# Patient Record
Sex: MALE | Race: WHITE
[De-identification: names, ages, dates, MRNs, and addresses within clinical notes are randomized per-mention and may not be internally consistent; named-entity substitution may affect disease eponyms.]

---

## 2017-11-02 ENCOUNTER — HOSPITAL ENCOUNTER (EMERGENCY)
Dept: HOSPITAL 84 - D.ER | Age: 57
Discharge: HOME | End: 2017-11-02
Payer: COMMERCIAL
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
## 2017-11-02 DIAGNOSIS — F17.200: ICD-10-CM
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
## 2017-11-02 DIAGNOSIS — M25.511: Primary | ICD-10-CM

## 2017-11-02 DIAGNOSIS — J44.9: ICD-10-CM
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
## 2017-11-02 DIAGNOSIS — S46.911A: ICD-10-CM

## 2017-11-12 ENCOUNTER — HOSPITAL ENCOUNTER (EMERGENCY)
Dept: HOSPITAL 75 - ER | Age: 57
Discharge: HOME | End: 2017-11-12
Payer: COMMERCIAL

## 2017-11-12 VITALS — BODY MASS INDEX: 32.9 KG/M2 | HEIGHT: 71 IN | WEIGHT: 235 LBS

## 2017-11-12 VITALS — DIASTOLIC BLOOD PRESSURE: 60 MMHG | SYSTOLIC BLOOD PRESSURE: 109 MMHG

## 2017-11-12 DIAGNOSIS — K21.9: ICD-10-CM

## 2017-11-12 DIAGNOSIS — Z82.49: ICD-10-CM

## 2017-11-12 DIAGNOSIS — Z87.19: ICD-10-CM

## 2017-11-12 DIAGNOSIS — M54.12: Primary | ICD-10-CM

## 2017-11-12 PROCEDURE — 99282 EMERGENCY DEPT VISIT SF MDM: CPT

## 2017-11-12 NOTE — ED UPPER EXTREMITY
General


Chief Complaint:  Upper Extremity


Stated Complaint:  R SHOULDER PAIN RADIATING INTO FINGERS


Nursing Triage Note:  


pt c/o right shoulder pain x 3 weeks.  pt was seen at hospital in [place], [place] last week et started on diclofenac et metaxalone and given a steroid 


injection.  denies improvement.  pain radiating to hand.


Nursing Sepsis Screen:  No Definite Risk


Source:  patient


Exam Limitations:  no limitations





History of Present Illness


Time seen by provider:  11:01


Initial Comments


To ER with right shoulder pain that radiates up into his neck and down into his 

fingers.  This is been present for one week without any known injury.  He works 

as a  and was seen at a hospital in [place] last week.  Given a 

prescription for metaxalone and diclofenac without improvement.  Does not have 

a local physician.


Onset:  last week


Severity:  moderate


Pain/Injury Location:  right shoulder, right arm, right hand


Method of Injury:  unknown


Modifying Factors:  Worse With Movement





Allergies and Home Medications


Allergies


Coded Allergies:  


     No Known Drug Allergies (Unverified , 9/21/16)





Home Medications


Diclofenac Sodium 75 Mg Tablet., (Reported)


Fluticasone/Vilanterol 1 Each Blst.w.dev, (Reported)


Metaxalone 800 Mg Tablet, (Reported)


Montelukast Sodium 10 Mg Tablet, (Reported)


Omeprazole Magnesium 20 Mg Tablet.dr, 20 MG PO DAILY, (Reported)


Pravastatin Sodium 20 Mg Tablet, 20 MG PO DAILY, #30


   Prescribed by: ROCAEL MOE on 9/22/16 1500





Constitutional:  see HPI


EENTM:  see HPI


Respiratory:  no symptoms reported


Cardiovascular:  no symptoms reported


Genitourinary:  no symptoms reported


Musculoskeletal:  no symptoms reported


Skin:  no symptoms reported





Past Medical-Social-Family Hx


Patient Social History


Type Used:  Cigarettes


Recent Foreign Travel:  No


Contact w/Someone Who Travel:  No


Recent Infectious Disease Expo:  No


Recent Hopitalizations:  No





Seasonal Allergies


Seasonal Allergies:  No





Surgeries


History of Surgeries:  No





Respiratory


History of Respiratory Disorde:  No


Currently Using CPAP:  No


Currently Using BIPAP:  No





Cardiovascular


History of Cardiac Disorders:  No





Neurological


History of Neurological Disord:  No





Reproductive System


Hx Reproductive Disorders:  No


Sexually Transmitted Disease:  No


HIV/AIDS:  No





Genitourinary


History of Genitourinary Disor:  No





Gastrointestinal


History of Gastrointestinal Di:  Yes


Gastrointestinal Disorders:  Abdominal Hernia, Gastroesophageal Reflux





Musculoskeletal


History of Musculoskeletal Dis:  No





Endocrine


History of Endocrine Disorders:  No





HEENT


History of HEENT Disorders:  No





Cancer


History of Cancer:  No


Did You Recieve Any Treatments:  No





Psychosocial


History of Psychiatric Problem:  No





Integumentary


History of Skin or Integumenta:  No





Blood Transfusions


History of Blood Disorders:  No


Adverse Reaction to a Blood Tr:  No





Family Medical History


Significant Family History:  CAD Over 55 Years Old


Family Medial History:  


Arthritis


  G8 BROTHER


Cardiovascular disease


  19 FATHER


Cataracts


  19 FATHER


Colon cancer


  19 FATHER


Deafness or hearing loss


  19 FATHER


Diabetes mellitus


  19 FATHER


Hypercholesterolemia


  19 FATHER


Hypertension


  19 FATHER


Myocardial infarction


  19 FATHER


  G8 BROTHER


Respiratory disorder


  G8 BROTHER





Physical Exam


Vital Signs





Vital Sign - Last 12Hours








 11/12/17





 09:22


 


Temp 98.0


 


Pulse 66


 


Resp 20


 


B/P (MAP) 109/60





Capillary Refill : Less Than 3 Seconds


General Appearance:  WD/WN, no apparent distress


HEENT:  PERRL/EOMI, normal ENT inspection


Neck:  non-tender, full range of motion


Cardiovascular:  regular rate, rhythm, no murmur


Respiratory:  normal breath sounds, no respiratory distress, no accessory 

muscle use


Gastrointestinal:  normal bowel sounds, non tender


Shoulder:  normal inspection, non-tender


Elbow/Forearm:  normal inspection, non-tender, Right


Wrist:  Yes normal inspection, Yes non-tender


Neurologic/Tendon:  normal sensation, normal motor functions, normal tendon 

functions


Neurologic/Psychiatric:  alert, normal mood/affect, oriented x 3


Skin:  normal color, warm/dry





Progress/Results/Core Measures


Results/Orders


Vital Signs/I&O





Vital Sign - Last 12Hours








 11/12/17





 09:22


 


Temp 98.0


 


Pulse 66


 


Resp 20


 


B/P (MAP) 109/60














Blood Pressure Mean:  76











Departure


Impression


Impression:  


 Primary Impression:  


 Cervical radiculopathy


Disposition:  01 HOME, SELF-CARE


Condition:  Stable





Departure-Patient Inst.


Decision time for Depature:  11:03


Referrals:  


NO,LOCAL PHYSICIAN (PCP/Family)


Primary Care Physician


Patient Instructions:  Radiculopathy





Add. Discharge Instructions:  


1.  Do not drive for 8 hours after taking one of these pain pills


2.  Continue taking your other medications


3.  Follow-up with a family physician to discuss further evaluation such as an 

MRI..


Scripts


Methylprednisolone (Medrol) 4 Mg Tab.ds.pk


4 MG PO UD, #1 PKG


   Prov: JORGE SALTER         11/12/17 


Hydrocodone/Acetaminophen (Norco 5-325 Tablet) 1 Each Tablet


1 EACH PO Q4H Y for PAIN-SEVERE, #14 TAB


   Prov: JORGE SALTER         11/12/17











JORGE SALTER Nov 12, 2017 11:04

## 2017-12-07 ENCOUNTER — HOSPITAL ENCOUNTER (OUTPATIENT)
Dept: HOSPITAL 75 - RAD | Age: 57
End: 2017-12-07
Attending: NURSE PRACTITIONER
Payer: COMMERCIAL

## 2017-12-07 DIAGNOSIS — M65.811: ICD-10-CM

## 2017-12-07 DIAGNOSIS — M75.111: ICD-10-CM

## 2017-12-07 DIAGNOSIS — S43.081A: Primary | ICD-10-CM

## 2017-12-07 PROCEDURE — 73221 MRI JOINT UPR EXTREM W/O DYE: CPT

## 2017-12-07 NOTE — DIAGNOSTIC IMAGING REPORT
PROCEDURE: MRI right joint upper extremity without contrast.



TECHNIQUE: Multiplanar, multisequence MR imaging of the right

shoulder was performed without contrast.



COMPARISON: None available.



INDICATION: Right shoulder pain with limited range of motion.



FINDINGS:



Rotator cuff: There is abnormal thickening of the anterior

supraspinatus with heterogeneous intrinsic signal indicative of

tendinopathy. A superimposed low-grade partial-thickness focal

interstitial tear is present in the anterior supraspinatus. The

teres minor and infraspinatus are normal. Subscapularis

tendinopathy with low-grade partial-thickness tearing of the deep

superior insertional fibers.



Glenoid labrum: By non-arthrogram imaging, the glenoid labrum

appears intact. No para-labral cyst.



Long head of biceps: Long head of biceps is partially subluxed

medially due to overlying partial-thickness tearing of the

subscapularis. The intracapsular segment remains intact.



Bones and cartilage: Humeral head is normal in morphology without

fracture or focal osseous lesion.  No glenohumeral

chondromalacia. Mild hypertrophic degenerative changes of the

acromioclavicular joint without significant inferior projecting

osteophytes.



Soft tissues: No glenohumeral joint effusion. No MRI findings to

suggest adhesive capsulitis. No fluid or inflammatory like signal

within the subacromial/subdeltoid space to indicate bursitis.



IMPRESSION: 

1. Supraspinatus and infraspinatus tendinopathy with superimposed

low-grade partial-thickness tears. No rotator cuff muscle

atrophy.

2. Medial subluxation of the long head of biceps secondary to

overlying partial-thickness tearing of the deep fibers of the

infraspinatus. Long head of biceps remains intact.



Dictated by: 



  Dictated on workstation # [id number]

## 2019-02-26 ENCOUNTER — HOSPITAL ENCOUNTER (OUTPATIENT)
Dept: HOSPITAL 75 - RAD | Age: 59
End: 2019-02-26
Attending: NURSE PRACTITIONER
Payer: COMMERCIAL

## 2019-02-26 DIAGNOSIS — J30.9: ICD-10-CM

## 2019-02-26 DIAGNOSIS — M79.604: ICD-10-CM

## 2019-02-26 DIAGNOSIS — J98.4: ICD-10-CM

## 2019-02-26 DIAGNOSIS — R91.1: ICD-10-CM

## 2019-02-26 DIAGNOSIS — M79.605: ICD-10-CM

## 2019-02-26 DIAGNOSIS — J43.9: Primary | ICD-10-CM

## 2019-02-26 DIAGNOSIS — G47.33: ICD-10-CM

## 2019-02-26 DIAGNOSIS — Z72.0: ICD-10-CM

## 2019-02-26 LAB
ARTERIAL PATENCY WRIST A: POSITIVE
BASE EXCESS STD BLDA CALC-SCNC: -2.5 MMOL/L (ref -2.5–2.5)
BDY SITE: (no result)
BODY TEMPERATURE: 97.3
BUN/CREAT SERPL: 9
CO2 BLDA CALC-SCNC: 22.9 MMOL/L (ref 21–31)
CREAT SERPL-MCNC: 1.17 MG/DL (ref 0.6–1.3)
GFR SERPLBLD BASED ON 1.73 SQ M-ARVRAT: > 60 ML/MIN
PCO2 BLDA: 36 MMHG (ref 35–45)
PH BLDA: 7.4 [PH] (ref 7.37–7.43)
PO2 BLDA: 70 MMHG (ref 79–93)
SAO2 % BLDA FROM PO2: 96 % (ref 94–100)
VENTILATION MODE VENT: NO

## 2019-02-26 PROCEDURE — 93970 EXTREMITY STUDY: CPT

## 2019-02-26 PROCEDURE — 82565 ASSAY OF CREATININE: CPT

## 2019-02-26 PROCEDURE — 36415 COLL VENOUS BLD VENIPUNCTURE: CPT

## 2019-02-26 PROCEDURE — 82805 BLOOD GASES W/O2 SATURATION: CPT

## 2019-02-26 PROCEDURE — 71275 CT ANGIOGRAPHY CHEST: CPT

## 2019-02-26 PROCEDURE — 84520 ASSAY OF UREA NITROGEN: CPT

## 2019-02-26 PROCEDURE — 36600 WITHDRAWAL OF ARTERIAL BLOOD: CPT

## 2019-02-26 NOTE — DIAGNOSTIC IMAGING REPORT
INDICATION: Bilateral leg pain.



Bilateral lower extremity venous Doppler study was performed in

the routine fashion with color flow Doppler and waveform

analysis.



FINDINGS: The common femoral veins, superficial femoral veins,

popliteal veins and visualized portion of the tibial veins show

normal compressibility and venous flow patterns. There is normal

augmentation. 



IMPRESSION: No evidence of deep vein thrombosis in the major

veins of both legs.



Dictated by: 



  Dictated on workstation # [id number]

## 2020-07-17 ENCOUNTER — HOSPITAL ENCOUNTER (OUTPATIENT)
Dept: HOSPITAL 75 - CARD | Age: 60
End: 2020-07-17
Attending: UROLOGY
Payer: COMMERCIAL

## 2020-07-17 DIAGNOSIS — K42.9: ICD-10-CM

## 2020-07-17 DIAGNOSIS — K76.0: Primary | ICD-10-CM

## 2020-07-17 DIAGNOSIS — C61: ICD-10-CM

## 2020-07-17 DIAGNOSIS — K57.30: ICD-10-CM

## 2020-07-17 PROCEDURE — 78306 BONE IMAGING WHOLE BODY: CPT

## 2020-07-17 PROCEDURE — 74176 CT ABD & PELVIS W/O CONTRAST: CPT

## 2020-07-17 NOTE — DIAGNOSTIC IMAGING REPORT
PROCEDURE: CT abdomen and pelvis without contrast.



TECHNIQUE: Multiple contiguous axial images were obtained through

the abdomen and pelvis without the use of intravenous contrast.

Auto Exposure Controls were utilized during the CT exam to meet

ALARA standards for radiation dose reduction. 



INDICATION:  Newly diagnosed prostate carcinoma.



No prior studies are available for comparison.



The lung bases are clear. Liver does demonstrate some generalized

low density suggestive of hepatic steatosis. No discrete liver

mass is detected. The gallbladder is unremarkable. No biliary

duct dilatation is identified. The pancreas and spleen are

unremarkable. No adrenal mass on the right is seen. There is a

low density mass left adrenal gland measuring 18 mm suggestive of

an adenoma. No renal calculi or hydronephrosis is identified.

Aorta is calcified but non-aneurysmal. No central retroperitoneal

or mesenteric lymphadenopathy is detected. Bowel loops are of

normal caliber. No obstruction is seen. There is diverticulosis

of the descending and sigmoid colon but no evidence of acute

diverticulitis. No definite free fluid or fluid collection is

identified. Appendix is unremarkable. No definite iliac or

inguinal lymphadenopathy is identified. The bladder is

unremarkable. Prostate contains central calcifications. There is

an area of soft tissue prominence along the posterior lateral

aspect of the prostate on the right side. Periprostatic lymph

node cannot be excluded. Area of soft tissue prominence measures

approximately 2.5 x 1.7 cm, best seen image 86, series 2. Bony

structures are without evidence of an osteoblastic lesion. Note

is made of a fat-containing umbilical hernia.



IMPRESSION:

1. Hepatic steatosis.

2. Uncomplicated diverticulosis.

3. Fat-containing umbilical hernia.

4. Soft tissue prominence adjacent to the right lateral and

slightly posterior aspect of the prostate. This could represent

periprostatic sunshine mass. No other areas of lymphadenopathy are

identified. No other suspicious abnormality is seen.



Dictated by: 



  Dictated on workstation # [id number]

## 2020-07-17 NOTE — DIAGNOSTIC IMAGING REPORT
INDICATION: Prostate carcinoma.



TECHNIQUE: Patient was administered 25.5 mCi technetium 99m MDP

intravenously and whole body imaging was performed after a

three-hour delay.



COMPARISON: No prior bone scans are available for comparison.



FINDINGS: There is normal uptake of activity by the axial and

appendicular skeleton. There is uptake by both kidneys with

excretion into the urinary bladder. No suspicious uptake is

identified to suggest osseous metastatic disease.



IMPRESSION: No scintigraphic evidence of osseous metastatic

disease.



Dictated by: 



  Dictated on workstation # [id number]

## 2020-08-13 ENCOUNTER — HOSPITAL ENCOUNTER (OUTPATIENT)
Dept: HOSPITAL 75 - RAD | Age: 60
End: 2020-08-13
Attending: UROLOGY
Payer: COMMERCIAL

## 2020-08-13 DIAGNOSIS — C61: Primary | ICD-10-CM

## 2020-08-13 LAB
BUN/CREAT SERPL: 13
CALCIUM SERPL-MCNC: 9.1 MG/DL (ref 8.5–10.1)
CHLORIDE SERPL-SCNC: 107 MMOL/L (ref 98–107)
CO2 SERPL-SCNC: 23 MMOL/L (ref 21–32)
CREAT SERPL-MCNC: 1.31 MG/DL (ref 0.6–1.3)
GFR SERPLBLD BASED ON 1.73 SQ M-ARVRAT: 56 ML/MIN
GLUCOSE SERPL-MCNC: 106 MG/DL (ref 70–105)
POTASSIUM SERPL-SCNC: 4.3 MMOL/L (ref 3.6–5)
SODIUM SERPL-SCNC: 138 MMOL/L (ref 135–145)

## 2020-08-13 PROCEDURE — 72197 MRI PELVIS W/O & W/DYE: CPT

## 2020-08-13 PROCEDURE — 36415 COLL VENOUS BLD VENIPUNCTURE: CPT

## 2020-08-13 PROCEDURE — 80048 BASIC METABOLIC PNL TOTAL CA: CPT

## 2020-08-13 NOTE — DIAGNOSTIC IMAGING REPORT
PROCEDURE: MRI pelvis with and without contrast.



TECHNIQUE: Multiplanar, multisequence MRI of the pelvis was

performed with and without contrast.



INDICATION:  Prostate cancer.



COMPARISON: CT abdomen and pelvis from 7/17/2020.



FINDINGS: There is abnormal hypointense signal on T2-weighted

imaging within the right joseph-aspect of the transitional zone

involving the base and mid gland. This is most focally

hypointense in the right posterolateral peripheral zone, and

there is extensive extraprostatic extension posterior laterally

into the perirectal fat. There is loss of fat plane between the

right joseph-aspect of the puborectalis muscle suspicious for

invasion. The inferior margin of the right seminal vesicle has

some hypointense signal within it and may be potentially invaded.

There is no obliteration of the neurovascular bundle at the level

of the apex of the prostate.



No lymphadenopathy along the external or internal iliac change.

No inguinal lymphadenopathy. Common iliac artery chains have no

lymphadenopathy. No regional sclerotic skeletal metastases.



IMPRESSION:

1. Tumor within the right joseph-aspect of the prostate involves

the transitional zone in the apex, mid gland and base. At the

level of the mid gland, there is extraprostatic extension into

the perirectal fat and invasion of the puborectalis muscle on the

right.

2. No regional lymphadenopathy.



Dictated by: 



  Dictated on workstation # [id number]

## 2020-09-02 ENCOUNTER — HOSPITAL ENCOUNTER (OUTPATIENT)
Dept: HOSPITAL 75 - ONC | Age: 60
LOS: 90 days | Discharge: HOME | End: 2020-12-01
Attending: RADIOLOGY
Payer: COMMERCIAL

## 2020-09-02 DIAGNOSIS — C61: Primary | ICD-10-CM

## 2020-09-02 PROCEDURE — 99204 OFFICE O/P NEW MOD 45 MIN: CPT

## 2021-01-04 ENCOUNTER — HOSPITAL ENCOUNTER (OUTPATIENT)
Dept: HOSPITAL 75 - RAD | Age: 61
End: 2021-01-04
Attending: RADIOLOGY
Payer: COMMERCIAL

## 2021-01-04 DIAGNOSIS — C61: Primary | ICD-10-CM

## 2021-01-04 PROCEDURE — 72197 MRI PELVIS W/O & W/DYE: CPT

## 2021-01-04 NOTE — DIAGNOSTIC IMAGING REPORT
PROCEDURE: MRI pelvis with and without contrast.



TECHNIQUE: Multiplanar, multisequence MRI of the pelvis was

performed with and without contrast.



INDICATION:  Prostate cancer undergoing therapy.



Compared with study 08/13/2020.



FINDINGS:



The previously well-visualized large right joseph-prostatic mass

with extraprostatic extension present on the prior can no longer

be visualized. A right and left joseph-prostate appeared symmetric

and normal in volume morphology and signal intensity. No

extraprostatic abnormality. The seminal vesicles unremarkable.

Obturator chains and pelvic sidewalls unremarkable. The inguinal

canals unremarkable. No lymphadenopathy. Slight thickening of the

walls of the urinary bladder chronic. No demonstrated bladder

mass. Bladder volume was not pathologic. No pelvic ascites. The

visualized pelvic marrow signal intensity normal.



IMPRESSION:



Excellent response. No MRI visualization of the previously well

seen prostatic/extraprostatic mass. No evidence for metastatic

disease. No visualized residual or recurrent neoplasm.



Dictated by: 



  Dictated on workstation # [id number]

## 2021-02-10 ENCOUNTER — HOSPITAL ENCOUNTER (OUTPATIENT)
Dept: HOSPITAL 75 - PREOP | Age: 61
Discharge: HOME | End: 2021-02-10
Attending: UROLOGY
Payer: COMMERCIAL

## 2021-02-10 VITALS — BODY MASS INDEX: 35.49 KG/M2 | WEIGHT: 253.53 LBS | HEIGHT: 70.98 IN

## 2021-02-10 DIAGNOSIS — Z01.812: Primary | ICD-10-CM

## 2021-02-10 DIAGNOSIS — Z20.822: ICD-10-CM

## 2021-02-10 DIAGNOSIS — C61: ICD-10-CM

## 2021-02-10 PROCEDURE — 87635 SARS-COV-2 COVID-19 AMP PRB: CPT

## 2021-02-12 ENCOUNTER — HOSPITAL ENCOUNTER (OUTPATIENT)
Dept: HOSPITAL 75 - SDC | Age: 61
Discharge: HOME | End: 2021-02-12
Attending: UROLOGY
Payer: COMMERCIAL

## 2021-02-12 VITALS — DIASTOLIC BLOOD PRESSURE: 76 MMHG | SYSTOLIC BLOOD PRESSURE: 133 MMHG

## 2021-02-12 VITALS — DIASTOLIC BLOOD PRESSURE: 80 MMHG | SYSTOLIC BLOOD PRESSURE: 123 MMHG

## 2021-02-12 VITALS — SYSTOLIC BLOOD PRESSURE: 118 MMHG | DIASTOLIC BLOOD PRESSURE: 87 MMHG

## 2021-02-12 VITALS — HEIGHT: 70.98 IN | BODY MASS INDEX: 35.49 KG/M2 | WEIGHT: 253.53 LBS

## 2021-02-12 VITALS — DIASTOLIC BLOOD PRESSURE: 77 MMHG | SYSTOLIC BLOOD PRESSURE: 135 MMHG

## 2021-02-12 VITALS — DIASTOLIC BLOOD PRESSURE: 73 MMHG | SYSTOLIC BLOOD PRESSURE: 127 MMHG

## 2021-02-12 VITALS — SYSTOLIC BLOOD PRESSURE: 108 MMHG | DIASTOLIC BLOOD PRESSURE: 59 MMHG

## 2021-02-12 VITALS — SYSTOLIC BLOOD PRESSURE: 126 MMHG | DIASTOLIC BLOOD PRESSURE: 74 MMHG

## 2021-02-12 VITALS — DIASTOLIC BLOOD PRESSURE: 57 MMHG | SYSTOLIC BLOOD PRESSURE: 90 MMHG

## 2021-02-12 VITALS — DIASTOLIC BLOOD PRESSURE: 71 MMHG | SYSTOLIC BLOOD PRESSURE: 107 MMHG

## 2021-02-12 VITALS — SYSTOLIC BLOOD PRESSURE: 107 MMHG | DIASTOLIC BLOOD PRESSURE: 71 MMHG

## 2021-02-12 VITALS — DIASTOLIC BLOOD PRESSURE: 77 MMHG | SYSTOLIC BLOOD PRESSURE: 113 MMHG

## 2021-02-12 DIAGNOSIS — E66.9: ICD-10-CM

## 2021-02-12 DIAGNOSIS — F41.9: ICD-10-CM

## 2021-02-12 DIAGNOSIS — G47.33: ICD-10-CM

## 2021-02-12 DIAGNOSIS — C61: Primary | ICD-10-CM

## 2021-02-12 DIAGNOSIS — F32.9: ICD-10-CM

## 2021-02-12 DIAGNOSIS — J43.9: ICD-10-CM

## 2021-02-12 DIAGNOSIS — I10: ICD-10-CM

## 2021-02-12 DIAGNOSIS — Z80.42: ICD-10-CM

## 2021-02-12 DIAGNOSIS — Z79.899: ICD-10-CM

## 2021-02-12 DIAGNOSIS — K21.9: ICD-10-CM

## 2021-02-12 DIAGNOSIS — Z79.51: ICD-10-CM

## 2021-02-12 DIAGNOSIS — Z80.0: ICD-10-CM

## 2021-02-12 PROCEDURE — 77332 RADIATION TREATMENT AID(S): CPT

## 2021-02-12 PROCEDURE — 77778 APPLY INTERSTIT RADIAT COMPL: CPT

## 2021-02-12 PROCEDURE — 77318 BRACHYTX ISODOSE COMPLEX: CPT

## 2021-02-12 PROCEDURE — 77370 RADIATION PHYSICS CONSULT: CPT

## 2021-02-12 PROCEDURE — 77290 THER RAD SIMULAJ FIELD CPLX: CPT

## 2021-02-12 PROCEDURE — 87081 CULTURE SCREEN ONLY: CPT

## 2021-02-12 PROCEDURE — 77470 SPECIAL RADIATION TREATMENT: CPT

## 2021-02-12 PROCEDURE — 55874 TPRNL PLMT BIODEGRDABL MATRL: CPT

## 2021-02-12 PROCEDURE — 76000 FLUOROSCOPY <1 HR PHYS/QHP: CPT

## 2021-02-12 PROCEDURE — 76965 ECHO GUIDANCE RADIOTHERAPY: CPT

## 2021-02-12 RX ADMIN — SODIUM CHLORIDE, SODIUM LACTATE, POTASSIUM CHLORIDE, AND CALCIUM CHLORIDE PRN MLS/HR: 600; 310; 30; 20 INJECTION, SOLUTION INTRAVENOUS at 06:50

## 2021-02-12 RX ADMIN — SODIUM CHLORIDE, SODIUM LACTATE, POTASSIUM CHLORIDE, AND CALCIUM CHLORIDE PRN MLS/HR: 600; 310; 30; 20 INJECTION, SOLUTION INTRAVENOUS at 08:25

## 2021-02-12 NOTE — PROGRESS NOTE-PRE OPERATIVE
Pre-Operative Progress Note


H&P Reviewed


The H&P was reviewed, patient examined and no changes noted.


Date Seen by Provider:  Feb 12, 2021


Time Seen by Provider:  07:24


Date H&P Reviewed:  Feb 12, 2021


Time H&P Reviewed:  07:24


Pre-Operative Diagnosis:  CA PROSTATE











TAWIL,ELIAS A MD               Feb 12, 2021 07:24

## 2021-02-12 NOTE — DISCHARGE INST-UROLOGY
Discharge Inst-Urology


Reconcile Patient Problems


Problems Reviewed?:  Yes


Final Diagnosis


CA PROSTATE





Patient Instructions/Follow Up


Plan/Assessment/Instructions


Discharge with [facility] and leg bag daytime and large bag night time with 

instructions





patient to come Monday morning to [facility]





Rest for 48 hours





Please make appointment to been seen in office in 2 weeks.





Increase oral fluids for 48 hours and then as needed.





Diet as tolerated.





If questions or concerns contact your physician 


Or seek help at emergency department.











TAWIL,ELIAS A MD               Feb 12, 2021 07:29

## 2021-02-12 NOTE — PROGRESS NOTE-POST OPERATIVE
Post-Operative Progess Note


Surgeon (s)/Assistant (s)


Surgeon


ELIAS TAWIL MD, DUANE MYERS, MD


Assistant:  NONE





Pre-Operative Diagnosis


CA PROSTATE





Post-Operative Diagnosis





SAME





Procedure & Operative Findings


Date of Procedure


2/12/21


Procedure Performed/Findings


BRACHYTHERAPY, CYSTOGRAM, PLACEMENT OF SPACE OAR


Anesthesia Type


GENERAL





Estimated Blood Loss


Estimated blood loss (mL):  NEGLIGIBLE





Specimens/Packing


Specimens Removed


NONE


Packing:  


NONE











TAWIL,ELIAS A MD               Feb 12, 2021 07:27

## 2021-02-12 NOTE — ANESTHESIA-GENERAL POST-OP
General


Patient Condition


Mental Status/LOC:  Same as Preop


Cardiovascular:  Satisfactory


Nausea/Vomiting:  Absent


Respiratory:  Satisfactory


Pain:  Controlled


Complications:  Absent





Post Op Complications


Complications


None





Follow Up Care/Instructions


Patient Instructions


None needed.





Anesthesia/Patient Condition


Patient Condition


Patient is doing well, no complaints, stable vital signs, no apparent adverse 

anesthesia problems.











ANDREA DE DIOS DO         Feb 12, 2021 11:06

## 2021-02-12 NOTE — DIAGNOSTIC IMAGING REPORT
INDICATION: Prostate cancer.



Intraoperative fluoroscopy views obtained during placement of

radiation seed implants by Dr. Tawil. 3 views are obtained. There

is contrast in the urinary bladder with Graff catheter in place.

The bladder wall had a smooth appearance with no evidence of

reflux or diverticular disease. 25 seconds of fluoroscopy time

was used.



IMPRESSION:



Intraoperative fluoroscopy used for cystogram. Radiation seed

implants are noted in the prostate gland. Urinary bladder was

grossly unremarkable on these views.



Dictated by: 



  Dictated on workstation # [id number]

## 2021-04-01 ENCOUNTER — HOSPITAL ENCOUNTER (OUTPATIENT)
Dept: HOSPITAL 75 - ONC | Age: 61
LOS: 4 days | Discharge: HOME | End: 2021-04-05
Attending: RADIOLOGY
Payer: COMMERCIAL

## 2021-04-01 DIAGNOSIS — C61: Primary | ICD-10-CM

## 2021-04-01 PROCEDURE — 77334 RADIATION TREATMENT AID(S): CPT

## 2021-04-01 PROCEDURE — 76873 ECHOGRAP TRANS R PROS STUDY: CPT

## 2021-04-01 PROCEDURE — 77290 THER RAD SIMULAJ FIELD CPLX: CPT

## 2021-04-23 ENCOUNTER — HOSPITAL ENCOUNTER (EMERGENCY)
Dept: HOSPITAL 75 - ER | Age: 61
Discharge: HOME | End: 2021-04-23
Payer: COMMERCIAL

## 2021-04-23 VITALS — SYSTOLIC BLOOD PRESSURE: 127 MMHG | DIASTOLIC BLOOD PRESSURE: 70 MMHG

## 2021-04-23 VITALS — BODY MASS INDEX: 35.56 KG/M2 | HEIGHT: 70.98 IN | WEIGHT: 253.97 LBS

## 2021-04-23 DIAGNOSIS — K21.9: ICD-10-CM

## 2021-04-23 DIAGNOSIS — Z20.822: ICD-10-CM

## 2021-04-23 DIAGNOSIS — F41.9: ICD-10-CM

## 2021-04-23 DIAGNOSIS — J11.1: Primary | ICD-10-CM

## 2021-04-23 DIAGNOSIS — I10: ICD-10-CM

## 2021-04-23 LAB
ALBUMIN SERPL-MCNC: 4.6 GM/DL (ref 3.2–4.5)
ALP SERPL-CCNC: 119 U/L (ref 40–136)
ALT SERPL-CCNC: 30 U/L (ref 0–55)
APTT PPP: YELLOW S
BACTERIA #/AREA URNS HPF: NEGATIVE /HPF
BASOPHILS # BLD AUTO: 0 10^3/UL (ref 0–0.1)
BASOPHILS NFR BLD AUTO: 1 % (ref 0–10)
BILIRUB SERPL-MCNC: 0.4 MG/DL (ref 0.1–1)
BILIRUB UR QL STRIP: NEGATIVE
BUN/CREAT SERPL: 10
CALCIUM SERPL-MCNC: 9.6 MG/DL (ref 8.5–10.1)
CHLORIDE SERPL-SCNC: 101 MMOL/L (ref 98–107)
CO2 SERPL-SCNC: 22 MMOL/L (ref 21–32)
CREAT SERPL-MCNC: 1.32 MG/DL (ref 0.6–1.3)
EOSINOPHIL # BLD AUTO: 0.2 10^3/UL (ref 0–0.3)
EOSINOPHIL NFR BLD AUTO: 3 % (ref 0–10)
FIBRINOGEN PPP-MCNC: CLEAR MG/DL
GFR SERPLBLD BASED ON 1.73 SQ M-ARVRAT: 55 ML/MIN
GLUCOSE SERPL-MCNC: 98 MG/DL (ref 70–105)
GLUCOSE UR STRIP-MCNC: NEGATIVE MG/DL
HCT VFR BLD CALC: 42 % (ref 40–54)
HGB BLD-MCNC: 13.6 G/DL (ref 13.3–17.7)
INR PPP: 0.9 (ref 0.8–1.4)
KETONES UR QL STRIP: NEGATIVE
LEUKOCYTE ESTERASE UR QL STRIP: NEGATIVE
LYMPHOCYTES # BLD AUTO: 1.1 10^3/UL (ref 1–4)
LYMPHOCYTES NFR BLD AUTO: 14 % (ref 12–44)
MANUAL DIFFERENTIAL PERFORMED BLD QL: NO
MCH RBC QN AUTO: 31 PG (ref 25–34)
MCHC RBC AUTO-ENTMCNC: 33 G/DL (ref 32–36)
MCV RBC AUTO: 94 FL (ref 80–99)
MONOCYTES # BLD AUTO: 0.6 10^3/UL (ref 0–1)
MONOCYTES NFR BLD AUTO: 8 % (ref 0–12)
NEUTROPHILS # BLD AUTO: 5.5 10^3/UL (ref 1.8–7.8)
NEUTROPHILS NFR BLD AUTO: 74 % (ref 42–75)
NITRITE UR QL STRIP: NEGATIVE
PH UR STRIP: 8 [PH] (ref 5–9)
PLATELET # BLD: 126 10^3/UL (ref 130–400)
PMV BLD AUTO: 11.1 FL (ref 9–12.2)
POTASSIUM SERPL-SCNC: 3.9 MMOL/L (ref 3.6–5)
PROT SERPL-MCNC: 7.6 GM/DL (ref 6.4–8.2)
PROT UR QL STRIP: NEGATIVE
PROTHROMBIN TIME: 12.7 SEC (ref 12.2–14.7)
RBC #/AREA URNS HPF: (no result) /HPF
SODIUM SERPL-SCNC: 136 MMOL/L (ref 135–145)
SP GR UR STRIP: 1.02 (ref 1.02–1.02)
SQUAMOUS #/AREA URNS HPF: (no result) /HPF
WBC # BLD AUTO: 7.4 10^3/UL (ref 4.3–11)
WBC #/AREA URNS HPF: (no result) /HPF

## 2021-04-23 PROCEDURE — 87088 URINE BACTERIA CULTURE: CPT

## 2021-04-23 PROCEDURE — 85025 COMPLETE CBC W/AUTO DIFF WBC: CPT

## 2021-04-23 PROCEDURE — 36415 COLL VENOUS BLD VENIPUNCTURE: CPT

## 2021-04-23 PROCEDURE — 83605 ASSAY OF LACTIC ACID: CPT

## 2021-04-23 PROCEDURE — 80053 COMPREHEN METABOLIC PANEL: CPT

## 2021-04-23 PROCEDURE — 87804 INFLUENZA ASSAY W/OPTIC: CPT

## 2021-04-23 PROCEDURE — 86141 C-REACTIVE PROTEIN HS: CPT

## 2021-04-23 PROCEDURE — 85610 PROTHROMBIN TIME: CPT

## 2021-04-23 PROCEDURE — 81000 URINALYSIS NONAUTO W/SCOPE: CPT

## 2021-04-23 PROCEDURE — 71045 X-RAY EXAM CHEST 1 VIEW: CPT

## 2021-04-23 PROCEDURE — 99284 EMERGENCY DEPT VISIT MOD MDM: CPT

## 2021-04-23 PROCEDURE — 87635 SARS-COV-2 COVID-19 AMP PRB: CPT

## 2021-04-23 PROCEDURE — 87040 BLOOD CULTURE FOR BACTERIA: CPT

## 2021-04-23 NOTE — ED GENERAL
General


Chief Complaint:  Cough/Cold/Flu Symptoms


Stated Complaint:  GENERAL BODY PAIN;HEAD ACHE;SOB


Nursing Triage Note:  


PT ARRIVED BY PRIVATE VEHICLE WITH CHIEF COMPLAINT OF COUGH, CHILLS, FEVER AND 


DIARRHEA. PT WAS ALERT, ORIENTED X 4 AND AMBULATORY ON ARRIVAL. PT STATED HE HAS




RECEIVED 13 RADIATION TREATMENTS FOR PROSTATE CANCER AND HAS 13 MORE TREATMENTS 


LEFT. HE GETS TREATMENT EVERY DAY. PT STATED TODAY AT [id number] HE STARTED TO 


HAVE CHILLS. PT HAS HAD DIARRHEA, BUT THEY THOUGHT IT MIGHT BE FROM RADIATION. 


HE DID NOT HAVE A FEVER WHEN HE RECEIVED HIS TREATMENT TODAY. PT DENIES 


ALLERGIES TO MEDICATIONS. PT SMOKES A PACK AND HALF A DAY OF CIGS. PT HAS 


GENERALIZED PAIN AT A 8. PT HAD FEVER .1 DEGREES ON ARRIVAL. IV WAS 


STARTED (22 GAUGE IN RIGHT HAND) WITH BLOOD DRAW. REPORT WAS GIVEN TO PROVIDER.


Nursing Sepsis Screen:  No Definite Risk


Source of Information:  Patient, Old Records


Exam Limitations:  No Limitations





History of Present Illness


Date Seen by Provider:  2021


Time Seen by Provider:  18:11


Initial Comments


This 61-year-old gentleman presents to the emergency room via private vehicle 

with concerns about some mild shortness of breath, cough, chills, and diarrhea. 

Symptoms are fairly mild but he is currently receiving daily radiation 

treatments for prostate cancer.  His last treatment was this morning.  He denies

any known Covid exposures.  He has not been vaccinated for COVID-19 yet but did 

receive his influenza vaccine.  He is febrile at this time with a temperature of

38.4 Celsius (101.1 Fahrenheit).  He appears in no distress and vital signs are 

otherwise normal.  He is on an androgen antagonist but no other chemotherapy.





Allergies and Home Medications


Allergies


Coded Allergies:  


     No Known Drug Allergies (Unverified , 21)





Home Medications


Albuterol Sulfate 1 Puff Puff, 1 PUFF INH PRN, (Reported)


   1 PUFF = 90 MCG 


Cetirizine HCl 10 Mg Tablet, 10 MG PO DAILY, (Reported)


Ciprofloxacin HCl 500 Mg Tablet, 500 MG PO BID


   Prescribed by: VICKIE POLLOCK on 21 0912


Escitalopram Oxalate 10 Mg Tablet, 10 MG PO DAILY, (Reported)


Lisinopril/Hydrochlorothiazide 1 Each Tablet, 1 EACH PO DAILY, (Reported)


Omeprazole Magnesium 20 Mg Tablet.dr, 20 MG PO DAILY, (Reported)


Phenazopyridine HCl 200 Mg Tablet, 1 TAB PO TID PRN for PAIN-MILD (1-4)


   Prescribed by: VICKIE POLLOCK on 21 09


Tramadol HCl 50 Mg Tablet, 1-2 TAB PO Q4H PRN for PAIN-MODERATE (5-7)


   Prescribed by: VICKIE POLLOCK on 21





Patient Home Medication List


Home Medication List Reviewed:  Yes





Review of Systems


Review of Systems


Constitutional:  see HPI


EENTM:  no symptoms reported


Respiratory:  see HPI


Cardiovascular:  no symptoms reported


Gastrointestinal:  see HPI


Genitourinary:  see HPI


Musculoskeletal:  no symptoms reported


Skin:  no symptoms reported


Psychiatric/Neurological:  No Symptoms Reported


Hematologic/Lymphatic:  See HPI


Immunological/Allergic:  no symptoms reported





Past Medical-Social-Family Hx


Past Med/Social Hx:  Reviewed and Corrections made


Patient Social History


Alcohol Use:  Denies Use


Type Used:  Cigarettes


Recent Infectious Disease Expo:  No


Recent Hopitalizations:  No





Seasonal Allergies


Seasonal Allergies:  No





Past Medical History


Surgeries:  Yes (Prostate biopsy)


Orthopedic (Rotator cuff)


Respiratory:  No


Currently Using CPAP:  Yes


Currently Using BIPAP:  No


Cardiac:  Yes


Hypertension


Neurological:  No


Reproductive Disorders:  No


Sexually Transmitted Disease:  No


HIV/AIDS:  No


Genitourinary:  No


Gastrointestinal:  Yes


Abdominal Hernia, Gastroesophageal Reflux


Musculoskeletal:  Yes (ROTATOR CUFF)


Endocrine:  No


HEENT:  No


Cancer:  No


Did You Recieve Any Treatments:  No


Psychosocial:  Yes


Anxiety


Integumentary:  No


Blood Disorders:  No


Adverse Reaction/Blood Tranf:  No





Family Medical History


Reviewed Nursing Family Hx





Arthritis


  G8 BROTHER


Cardiovascular disease


  19 FATHER


Cataracts


  19 FATHER


Colon cancer


  19 FATHER


Deafness or hearing loss


  19 FATHER


Diabetes mellitus


  19 FATHER


Hypercholesterolemia


  19 FATHER


Hypertension


  19 FATHER


Myocardial infarction


  19 FATHER


  G8 BROTHER


Respiratory disorder


  G8 BROTHER


CAD Over 55 Years Old





Physical Exam


Vital Signs





Vital Signs - First Documented








 21





 18:06


 


Temp 38.4


 


Pulse 82


 


Resp 13


 


B/P (MAP) 129/71 (90)


 


Pulse Ox 98


 


O2 Delivery Room Air





Capillary Refill : Less Than 3 Seconds


Height, Weight, BMI


Height: 5'11.00"


Weight: 259lbs. 1.0oz. 117.367490ip; 35.00 BMI


Method:Stated


General Appearance:  No Apparent Distress, WD/WN


HEENT:  PERRL/EOMI, Normal ENT Inspection, Other (Mucous membranes moist)


Neck:  Normal Inspection


Respiratory:  Lungs Clear, Normal Breath Sounds, No Accessory Muscle Use; No 

Crackles, No Wheezing


Cardiovascular:  Regular Rate, Rhythm, No Edema, No Murmur


Gastrointestinal:  Normal Bowel Sounds, Non Tender, Soft


Extremity:  Normal Inspection, No Pedal Edema


Neurologic/Psychiatric:  Alert, Oriented x3, No Motor/Sensory Deficits, Normal 

Mood/Affect, CNs II-XII Norm as Tested


Skin:  Normal Color, Warm/Dry





Focused Exam


Lactate Level


21 18:58: Lactic Acid Level 1.98





Lactic Acid Level





Laboratory Tests








Test


 21


18:58


 


Lactic Acid Level


 1.98 MMOL/L


(0.50-2.00)











Progress/Results/Core Measures


Suspected Sepsis


Recent Fever Within 48 Hours:  Yes


Infection Criteria Present:  Suspected New Infection


New/Unexplained  Altered Menta:  No


Sepsis Screen:  No Definite Risk


SIRS


Temperature: 


Pulse: 82 


Respiratory Rate: 13


 


Laboratory Tests


21 18:58: White Blood Count 7.4


Blood Pressure 129 /71 


Mean: 90


 


21 18:58: Lactic Acid Level 1.98


Laboratory Tests


21 18:58: 


Creatinine 1.32H, INR Comment 0.9, Platelet Count 126L, Total Bilirubin 0.4








Results/Orders


Lab Results





Laboratory Tests








Test


 21


18:58 21


20:49 Range/Units


 


 


White Blood Count


 7.4 


 


 4.3-11.0


10^3/uL


 


Red Blood Count


 4.43 


 


 4.30-5.52


10^6/uL


 


Hemoglobin 13.6   13.3-17.7  g/dL


 


Hematocrit 42   40-54  %


 


Mean Corpuscular Volume 94   80-99  fL


 


Mean Corpuscular Hemoglobin 31   25-34  pg


 


Mean Corpuscular Hemoglobin


Concent 33 


 


 32-36  g/dL





 


Red Cell Distribution Width 13.4   10.0-14.5  %


 


Platelet Count


 126 L


 


 130-400


10^3/uL


 


Mean Platelet Volume 11.1   9.0-12.2  fL


 


Immature Granulocyte % (Auto) 0    %


 


Neutrophils (%) (Auto) 74   42-75  %


 


Lymphocytes (%) (Auto) 14   12-44  %


 


Monocytes (%) (Auto) 8   0-12  %


 


Eosinophils (%) (Auto) 3   0-10  %


 


Basophils (%) (Auto) 1   0-10  %


 


Neutrophils # (Auto)


 5.5 


 


 1.8-7.8


10^3/uL


 


Lymphocytes # (Auto)


 1.1 


 


 1.0-4.0


10^3/uL


 


Monocytes # (Auto)


 0.6 


 


 0.0-1.0


10^3/uL


 


Eosinophils # (Auto)


 0.2 


 


 0.0-0.3


10^3/uL


 


Basophils # (Auto)


 0.0 


 


 0.0-0.1


10^3/uL


 


Immature Granulocyte # (Auto)


 0.0 


 


 0.0-0.1


10^3/uL


 


Prothrombin Time 12.7   12.2-14.7  SEC


 


INR Comment 0.9   0.8-1.4  


 


Sodium Level 136   135-145  MMOL/L


 


Potassium Level 3.9   3.6-5.0  MMOL/L


 


Chloride Level 101     MMOL/L


 


Carbon Dioxide Level 22   21-32  MMOL/L


 


Anion Gap 13   5-14  MMOL/L


 


Blood Urea Nitrogen 13   7-18  MG/DL


 


Creatinine


 1.32 H


 


 0.60-1.30


MG/DL


 


Estimat Glomerular Filtration


Rate 55 


 


  





 


BUN/Creatinine Ratio 10    


 


Glucose Level 98     MG/DL


 


Lactic Acid Level


 1.98 


 


 0.50-2.00


MMOL/L


 


Calcium Level 9.6   8.5-10.1  MG/DL


 


Corrected Calcium    8.5-10.1  MG/DL


 


Total Bilirubin 0.4   0.1-1.0  MG/DL


 


Aspartate Amino Transf


(AST/SGOT) 27 


 


 5-34  U/L





 


Alanine Aminotransferase


(ALT/SGPT) 30 


 


 0-55  U/L





 


Alkaline Phosphatase 119     U/L


 


C-Reactive Protein High


Sensitivity 2.16 H


 


 0.00-0.50


MG/DL


 


Total Protein 7.6   6.4-8.2  GM/DL


 


Albumin 4.6 H  3.2-4.5  GM/DL


 


Coronavirus 2019 (HOSSEIN) Not Detected   Not Detecte  


 


Urine Color  YELLOW   


 


Urine Clarity  CLEAR   


 


Urine pH  8.0  5-9  


 


Urine Specific Gravity  1.020  1.016-1.022  


 


Urine Protein  NEGATIVE  NEGATIVE  


 


Urine Glucose (UA)  NEGATIVE  NEGATIVE  


 


Urine Ketones  NEGATIVE  NEGATIVE  


 


Urine Nitrite  NEGATIVE  NEGATIVE  


 


Urine Bilirubin  NEGATIVE  NEGATIVE  


 


Urine Urobilinogen  1.0  < = 1.0  MG/DL


 


Urine Leukocyte Esterase  NEGATIVE  NEGATIVE  


 


Urine RBC (Auto)  NEGATIVE  NEGATIVE  


 


Urine RBC  RARE   /HPF


 


Urine WBC  NONE   /HPF


 


Urine Squamous Epithelial


Cells 


 0-2 


  /HPF





 


Urine Crystals  NONE   /LPF


 


Urine Bacteria  NEGATIVE   /HPF


 


Urine Casts  NONE   /LPF


 


Urine Mucus  NEGATIVE   /LPF


 


Urine Culture Indicated  NO   








Micro Results





Microbiology


21 Influenza Types A,B Antigen ([place]) - Final, Complete


          





My Orders





Orders - AMISH MAKI MD


Influenza A And B Antigens (21 18:11)


Covid 19 Inhouse Test (21 18:11)


Cbc With Automated Diff (21 18:45)


Comprehensive Metabolic Panel (21 18:45)


Blood Culture (21 18:45)


Sputum Culture (21 18:45)


Urinalysis (21 18:45)


Urine Culture (21 18:45)


Protime With Inr (21 18:45)


Chest 1 View, Ap/Pa Only (21 18:45)


Ed Iv/Invasive Line Start (21 18:45)


Vital Signs Adult Sepsis Patie Q15M (21 18:45)


Remove Rings In Anticipation O (21 18:45)


Lactic Acid Analyzer (21 18:45)


Hs C Reactive Protein (21 19:46)





Vital Signs/I&O











 21





 18:06 18:38


 


Temp 38.4 


 


Pulse 82 


 


Resp 13 


 


B/P (MAP) 129/71 (90) 


 


Pulse Ox 98 


 


O2 Delivery Room Air Room Air





Capillary Refill : Less Than 3 Seconds








Blood Pressure Mean:                    90








Progress Note #1:  


   Time:  19:57


Progress Note


Patient was seen and examined.  Because of his cancer history and fever a septic

work-up was pursued.  COVID-19 swab was negative.  Chest x-ray was unremarkable.

 UA, influenza screen, and CRP are pending.


Progress Note #2:  


   Time:  21:14


Progress Note


Work-up did not reveal any source of infection.  Influenza and Covid screens 

were negative.  No source of bacterial infection was identified.  WBC was normal

and CRP was low.  Patient is not truly immunocompromised as he is not receiving 

any chemotherapies other than androgen blocking medication at this time.  He is 

being discharged home with instructions for careful observation and a low 

threshold to return to care.





Diagnostic Imaging





   Diagonstic Imaging:  Xray


   Plain Films/CT/US/NM/MRI:  chest


Comments


Chest x-ray viewed by me and report reviewed.  See report below:





NAME:   ODESSA CLAROS  


[facility] REC#:   [id number]  


ACCOUNT#:   [id number]  


PT STATUS:   REG ER  


:   1960  


PHYSICIAN:   AMISH MAKI MD  


ADMIT DATE:   21/ER  


                                                                      

***Draft***  


Date of Exam:21  


 


CHEST 1 VIEW, AP/PA ONLY  


 


INDICATION: Shortness of breath.   


 


 FINDINGS: The lungs are clear. The heart and vessels are normal.  


 No failure, effusion or pneumothorax. No change from comparison  


 2018.    


 


 IMPRESSION: Normal frontal chest.   


 


   Dictated on workstation # [id number]  


 


Dict:   21  


Trans:   21  


PJE [id number]  


 


Interpreted by:     IRENE SOMERS





Departure


Impression





   Primary Impression:  


   Flu-like symptoms


Disposition:  01 HOME, SELF-CARE


Condition:  Stable





Departure-Patient Inst.


Decision time for Depature:  21:16


Referrals:  


[facility] (PCP)


Primary Care Physician








KING MOREIRA (Family)


Primary Care Physician


Patient Instructions:  Fever, Adult (DC)





Add. Discharge Instructions:  


Drink plenty of clear liquids to stay well-hydrated.


Stay home and avoid contact with others until you are free of fever (100.3 

Fahrenheit) without Tylenol or ibuprofen for at least 24 hours.  This will 

prevent infecting others with a possible viral illness.


You may take Tylenol (acetaminophen) up to 1000 mg every 6 hours as needed for 

pain or fever.  You may add ibuprofen sparingly up to 600 mg every 6 hours as 

needed for pain or fever not controlled by Tylenol.


Call with questions or concerns.


Work toward quitting smoking as rapidly as possible.  Seek assistance from your 

primary care provider if needed.


Return to care if you have new or worsening symptoms.


Follow-up with your primary care provider and at the Cancer Center as soon as 

possible.





All discharge instructions reviewed with patient and/or family. Voiced 

understanding.





Copy


Copies To 1:   CECI PEREZ


Copies To 2:   ENDY BENITEZ JOSHUA T MD        2021 19:53

## 2021-04-23 NOTE — DIAGNOSTIC IMAGING REPORT
INDICATION: Shortness of breath. 



FINDINGS: The lungs are clear. The heart and vessels are normal.

No failure, effusion or pneumothorax. No change from comparison

06/27/2018.  



IMPRESSION: Normal frontal chest. 



Dictated by: 



  Dictated on workstation # [id number]

## 2021-06-20 ENCOUNTER — HOSPITAL ENCOUNTER (EMERGENCY)
Dept: HOSPITAL 75 - ER | Age: 61
Discharge: HOME | End: 2021-06-20
Payer: COMMERCIAL

## 2021-06-20 VITALS — HEIGHT: 70.87 IN | WEIGHT: 255.52 LBS | BODY MASS INDEX: 35.77 KG/M2

## 2021-06-20 DIAGNOSIS — K21.9: ICD-10-CM

## 2021-06-20 DIAGNOSIS — J44.1: Primary | ICD-10-CM

## 2021-06-20 DIAGNOSIS — F17.210: ICD-10-CM

## 2021-06-20 DIAGNOSIS — Z79.899: ICD-10-CM

## 2021-06-20 DIAGNOSIS — Z20.822: ICD-10-CM

## 2021-06-20 DIAGNOSIS — F41.9: ICD-10-CM

## 2021-06-20 DIAGNOSIS — I10: ICD-10-CM

## 2021-06-20 LAB
ALBUMIN SERPL-MCNC: 3.9 GM/DL (ref 3.2–4.5)
ALP SERPL-CCNC: 99 U/L (ref 40–136)
ALT SERPL-CCNC: 21 U/L (ref 0–55)
APTT BLD: 25 SEC (ref 24–35)
BASOPHILS # BLD AUTO: 0.1 10^3/UL (ref 0–0.1)
BASOPHILS NFR BLD AUTO: 1 % (ref 0–10)
BILIRUB SERPL-MCNC: 0.3 MG/DL (ref 0.1–1)
BUN/CREAT SERPL: 11
CALCIUM SERPL-MCNC: 9.3 MG/DL (ref 8.5–10.1)
CHLORIDE SERPL-SCNC: 105 MMOL/L (ref 98–107)
CO2 SERPL-SCNC: 23 MMOL/L (ref 21–32)
CREAT SERPL-MCNC: 1.37 MG/DL (ref 0.6–1.3)
EOSINOPHIL # BLD AUTO: 0.4 10^3/UL (ref 0–0.3)
EOSINOPHIL NFR BLD AUTO: 6 % (ref 0–10)
GFR SERPLBLD BASED ON 1.73 SQ M-ARVRAT: 53 ML/MIN
GLUCOSE SERPL-MCNC: 129 MG/DL (ref 70–105)
HCT VFR BLD CALC: 36 % (ref 40–54)
HGB BLD-MCNC: 12 G/DL (ref 13.3–17.7)
INR PPP: 0.9 (ref 0.8–1.4)
LYMPHOCYTES # BLD AUTO: 1.4 10^3/UL (ref 1–4)
LYMPHOCYTES NFR BLD AUTO: 23 % (ref 12–44)
MAGNESIUM SERPL-MCNC: 1.7 MG/DL (ref 1.6–2.4)
MANUAL DIFFERENTIAL PERFORMED BLD QL: NO
MCH RBC QN AUTO: 31 PG (ref 25–34)
MCHC RBC AUTO-ENTMCNC: 34 G/DL (ref 32–36)
MCV RBC AUTO: 93 FL (ref 80–99)
MONOCYTES # BLD AUTO: 0.5 10^3/UL (ref 0–1)
MONOCYTES NFR BLD AUTO: 8 % (ref 0–12)
NEUTROPHILS # BLD AUTO: 3.9 10^3/UL (ref 1.8–7.8)
NEUTROPHILS NFR BLD AUTO: 63 % (ref 42–75)
PLATELET # BLD: 155 10^3/UL (ref 130–400)
PMV BLD AUTO: 11 FL (ref 9–12.2)
POTASSIUM SERPL-SCNC: 3.9 MMOL/L (ref 3.6–5)
PROT SERPL-MCNC: 6.6 GM/DL (ref 6.4–8.2)
PROTHROMBIN TIME: 12.8 SEC (ref 12.2–14.7)
SODIUM SERPL-SCNC: 139 MMOL/L (ref 135–145)
WBC # BLD AUTO: 6.2 10^3/UL (ref 4.3–11)

## 2021-06-20 PROCEDURE — 80053 COMPREHEN METABOLIC PANEL: CPT

## 2021-06-20 PROCEDURE — 83880 ASSAY OF NATRIURETIC PEPTIDE: CPT

## 2021-06-20 PROCEDURE — 85730 THROMBOPLASTIN TIME PARTIAL: CPT

## 2021-06-20 PROCEDURE — 93005 ELECTROCARDIOGRAM TRACING: CPT

## 2021-06-20 PROCEDURE — 83874 ASSAY OF MYOGLOBIN: CPT

## 2021-06-20 PROCEDURE — 71045 X-RAY EXAM CHEST 1 VIEW: CPT

## 2021-06-20 PROCEDURE — 85025 COMPLETE CBC W/AUTO DIFF WBC: CPT

## 2021-06-20 PROCEDURE — 94640 AIRWAY INHALATION TREATMENT: CPT

## 2021-06-20 PROCEDURE — 36415 COLL VENOUS BLD VENIPUNCTURE: CPT

## 2021-06-20 PROCEDURE — 84484 ASSAY OF TROPONIN QUANT: CPT

## 2021-06-20 PROCEDURE — 87636 SARSCOV2 & INF A&B AMP PRB: CPT

## 2021-06-20 PROCEDURE — 83735 ASSAY OF MAGNESIUM: CPT

## 2021-06-20 PROCEDURE — 85610 PROTHROMBIN TIME: CPT

## 2021-06-20 NOTE — ED CARDIAC GENERAL
History of Present Illness


General


Chief Complaint:  Chest Pain


Stated Complaint:  CHEST PAIN, L ARM PAIN


Nursing Triage Note:  


PT TO ED 5 PER AMB W/ C/O CHEST PAIN ONSET X1 WEEK, WORSE @0930 TODAY.  PT 


REPORTS COUGH, SOB, DIAPHORESIS, NAUSEA W/ PAIN RADIATING TO LT SHOULDER ET ARM.


Source:  patient


Exam Limitations:  no limitations





History of Present Illness


Date Seen by Provider:  2021


Time Seen by Provider:  13:17


Initial Comments


To ER with 1 week history of chest pain described as tightness in his chest and 

difficulty getting a deep breath.  This is been present for 1 week.  Has had a 

cough, nausea, pain that radiates to the left shoulder and left arm.  He has had

chills and diaphoresis at night.  He has had a [employer] Covid vaccine. 

He does have COPD.  No personal history of heart disease or coronary stenting.


Timing/Duration:  changing over time


Severity:  moderate


Location:  central


Prior CP/Workup:  no prior chest pain


NTG SL PTA:  No


ASA po PTA:  No





Allergies and Home Medications


Allergies


Coded Allergies:  


     No Known Drug Allergies (Unverified , 21)





Home Medications


Albuterol Sulfate 1 Puff Puff, 1 PUFF INH PRN, (Reported)


   1 PUFF = 90 MCG 


Cetirizine HCl 10 Mg Tablet, 10 MG PO DAILY, (Reported)


Ciprofloxacin HCl 500 Mg Tablet, 500 MG PO BID


   Prescribed by: VICKIE POLLOCK on 21


Escitalopram Oxalate 10 Mg Tablet, 10 MG PO DAILY, (Reported)


Lisinopril/Hydrochlorothiazide 1 Each Tablet, 1 EACH PO DAILY, (Reported)


Omeprazole Magnesium 20 Mg Tablet.dr, 20 MG PO DAILY, (Reported)


Phenazopyridine HCl 200 Mg Tablet, 1 TAB PO TID PRN for PAIN-MILD (1-4)


   Prescribed by: VICKIE POLLOCK on 21


Tramadol HCl 50 Mg Tablet, 1-2 TAB PO Q4H PRN for PAIN-MODERATE (5-7)


   Prescribed by: VICKIE POLLOCK on 21





Patient Home Medication List


Home Medication List Reviewed:  Yes





Review of Systems


Review of Systems


Constitutional:  see HPI, chills


EENTM:  No Symptoms Reported


Respiratory:  See HPI, Cough


Cardiovascular:  See HPI, Chest Pain


Gastrointestinal:  No Symptoms Reported


Genitourinary:  No Symptoms Reported


Musculoskeletal:  no symptoms reported


Skin:  no symptoms reported


Psychiatric/Neurological:  No Symptoms Reported


Endocrine:  No Symptoms Reported


Hematologic/Lymphatic:  No Symptoms Reported





Past Medical-Social-Family Hx


Patient Social History


Alcohol Use:  Denies Use


Smoking Status:  Current Everyday Smoker


Type Used:  Cigarettes


Recent Infectious Disease Expo:  No


Recent Hopitalizations:  No





Seasonal Allergies


Seasonal Allergies:  No





Past Medical History


Surgeries:  Yes (Prostate biopsy)


Orthopedic


Respiratory:  No


Currently Using CPAP:  Yes


Currently Using BIPAP:  No


Cardiac:  Yes


Hypertension


Neurological:  No


Reproductive Disorders:  No


Sexually Transmitted Disease:  No


HIV/AIDS:  No


Genitourinary:  No


Gastrointestinal:  Yes


Abdominal Hernia, Gastroesophageal Reflux


Musculoskeletal:  Yes (ROTATOR CUFF)


Endocrine:  No


HEENT:  No


Cancer:  Yes


Prostate


Did You Recieve Any Treatments:  No


Psychosocial:  Yes


Anxiety


Integumentary:  No


Blood Disorders:  No


Adverse Reaction/Blood Tranf:  No





Family Medical History





Arthritis


  G8 BROTHER


Cardiovascular disease


  19 FATHER


Cataracts


  19 FATHER


Colon cancer


  19 FATHER


Deafness or hearing loss


  19 FATHER


Diabetes mellitus


  19 FATHER


Hypercholesterolemia


  19 FATHER


Hypertension


  19 FATHER


Myocardial infarction


  19 FATHER


  G8 BROTHER


Respiratory disorder


  G8 BROTHER


CAD Over 55 Years Old





Physical Exam


Vital Signs





Vital Signs - First Documented




















Capillary Refill : Less Than 3 Seconds


Height, Weight, BMI


Height: 5'11.00"


Weight: 259lbs. 1.0oz. 117.456284zd; 35.00 BMI


Method:Stated


General Appearance:  No Apparent Distress, WD/WN


Neck:  Full Range of Motion, Normal Inspection


Respiratory:  Normal Breath Sounds, No Accessory Muscle Use, No Respiratory 

Distress


Cardiovascular:  Regular Rate, Rhythm, Normal Peripheral Pulses


Gastrointestinal:  Normal Bowel Sounds, Non Tender, Soft


Neurologic/Psychiatric:  Alert, Oriented x3


Skin:  Normal Color, Warm/Dry





Progress/Results/Core Measures


Results/Orders


Lab Results





Laboratory Tests








Test


 21


13:21 21


13:36 Range/Units


 


 


White Blood Count


 6.2 


 


 4.3-11.0


10^3/uL


 


Red Blood Count


 3.86 L


 


 4.30-5.52


10^6/uL


 


Hemoglobin 12.0 L  13.3-17.7  g/dL


 


Hematocrit 36 L  40-54  %


 


Mean Corpuscular Volume 93   80-99  fL


 


Mean Corpuscular Hemoglobin 31   25-34  pg


 


Mean Corpuscular Hemoglobin


Concent 34 


 


 32-36  g/dL





 


Red Cell Distribution Width 14.1   10.0-14.5  %


 


Platelet Count


 155 


 


 130-400


10^3/uL


 


Mean Platelet Volume 11.0   9.0-12.2  fL


 


Immature Granulocyte % (Auto) 0    %


 


Neutrophils (%) (Auto) 63   42-75  %


 


Lymphocytes (%) (Auto) 23   12-44  %


 


Monocytes (%) (Auto) 8   0-12  %


 


Eosinophils (%) (Auto) 6   0-10  %


 


Basophils (%) (Auto) 1   0-10  %


 


Neutrophils # (Auto)


 3.9 


 


 1.8-7.8


10^3/uL


 


Lymphocytes # (Auto)


 1.4 


 


 1.0-4.0


10^3/uL


 


Monocytes # (Auto)


 0.5 


 


 0.0-1.0


10^3/uL


 


Eosinophils # (Auto)


 0.4 H


 


 0.0-0.3


10^3/uL


 


Basophils # (Auto)


 0.1 


 


 0.0-0.1


10^3/uL


 


Immature Granulocyte # (Auto)


 0.0 


 


 0.0-0.1


10^3/uL


 


Percent Immature Platelet


Fraction 4.8 


 


 0.0-7.6  %





 


Prothrombin Time 12.8   12.2-14.7  SEC


 


INR Comment 0.9   0.8-1.4  


 


Activated Partial


Thromboplast Time 25 


 


 24-35  SEC





 


Sodium Level 139   135-145  MMOL/L


 


Potassium Level 3.9   3.6-5.0  MMOL/L


 


Chloride Level 105     MMOL/L


 


Carbon Dioxide Level 23   21-32  MMOL/L


 


Anion Gap 11   5-14  MMOL/L


 


Blood Urea Nitrogen 15   7-18  MG/DL


 


Creatinine


 1.37 H


 


 0.60-1.30


MG/DL


 


Estimat Glomerular Filtration


Rate 53 


 


  





 


BUN/Creatinine Ratio 11    


 


Glucose Level 129 H    MG/DL


 


Calcium Level 9.3   8.5-10.1  MG/DL


 


Corrected Calcium 9.4   8.5-10.1  MG/DL


 


Magnesium Level 1.7   1.6-2.4  MG/DL


 


Total Bilirubin 0.3   0.1-1.0  MG/DL


 


Aspartate Amino Transf


(AST/SGOT) 18 


 


 5-34  U/L





 


Alanine Aminotransferase


(ALT/SGPT) 21 


 


 0-55  U/L





 


Alkaline Phosphatase 99     U/L


 


Myoglobin


 67.8 


 


 10.0-92.0


NG/ML


 


Troponin I < 0.028   <0.028  NG/ML


 


B-Type Natriuretic Peptide 43.3   <100.0  PG/ML


 


Total Protein 6.6   6.4-8.2  GM/DL


 


Albumin 3.9   3.2-4.5  GM/DL


 


Influenza Type A (RT-PCR)  Not Detected  Not Detecte  


 


Influenza Type B (RT-PCR)  Not Detected  Not Detecte  


 


SARS-CoV-2 RNA (RT-PCR)  Not Detected  Not Detecte  








My Orders





Orders - JORGE SALTER APRN


Cbc With Automated Diff (21 13:21)


Magnesium (21 13:21)


Chest 1 View, Ap/Pa Only (21 13:21)


Ekg Tracing (21 13:21)


Comprehensive Metabolic Panel (21 13:21)


Myoglobin Serum (21 13:21)


Protime With Inr (21 13:21)


Partial Thromboplastin Time (21 13:21)


O2 (21 13:21)


Monitor-Rhythm Ecg Trace Only (21 13:21)


Lipid Panel (21 06:00)


Ed Iv/Invasive Line Start (21 13:21)


BNP (21 13:21)


Troponin I (21 13:21)


Aspirin Chewable Tablet (Baby Aspirin Ch (21 13:30)


Covid 19 Inhouse Test (21 13:21)


Influenza A And B By Pcr (21 13:21)


Albuterol Inhaler (Ventolin Hfa) (21 14:00)


Albuterol/Ipra Inhalation Soln (Duoneb I (21 14:15)


Svn Small Volume Nebulizer (21 14:05)


Ns Iv 1000 Ml (Sodium Chloride 0.9%) (21 14:15)


Ns Iv 1000 Ml (Sodium Chloride 0.9%) (21 14:10)


Albuterol/Ipra Inhalation Soln (Duoneb I (21 14:20)





Medications Given in ED





Current Medications








 Medications  Dose


 Ordered  Sig/Vitor


 Route  Start Time


 Stop Time Status Last Admin


Dose Admin


 


 Albuterol/


 Ipratropium  3 ml  ONCE  ONCE


 INH  21 14:15


 21 14:16 DC 21 14:23


3 ML


 


 Aspirin  324 mg  ONCE  ONCE


 PO  21 13:30


 21 13:31 DC 21 13:29


324 MG








Vital Signs/I&O











 21





 13:13 13:13 14:24


 


Temp 36.0  


 


Pulse 66  


 


Resp 20  


 


B/P (MAP) 109/65 (80)  


 


Pulse Ox 95  94


 


O2 Delivery Room Air Room Air Room Air














Blood Pressure Mean:                    80











Progress


Progress Note :  


Progress Note


NAME:   ODESSA CLAROS


[facility] REC#:   [id number]


ACCOUNT#:   [id number]


PT STATUS:   REG ER


:   1960


PHYSICIAN:   JORGE SALTER


ADMIT DATE:   21/ER


                                   ***Draft***


Date of Exam:21





CHEST 1 VIEW, AP/PA ONLY








Clinical indications: Patient with chest pain.





Exam: Portable chest x-ray upright view.





Comparisons: Chest x-ray dated 2021.





Findings:





Lungs/pleura: Lungs are clear. There is no pneumothorax. There is


no pleural effusion.





Mediastinum: Unremarkable. 





Pulmonary vasculature: Unremarkable.





Heart: Unremarkable.





Bones/extrathoracic soft tissue: Unremarkable.





Impression:


There is no radiographic evidence of acute cardiopulmonary


process.





  Dictated on workstation # [id number]








Dict:   21 [id number]


Trans:   21 1341


CVB [id number]





Interpreted by:     RONNY FUENTES MD


Electronically signed by:





Departure


Communication (Admissions)


9880-EKG shows sinus rhythm, no ST segment changes, no ectopy





Impression





   Primary Impression:  


   COPD exacerbation


Disposition:  01 HOME, SELF-CARE


Condition:  Stable





Departure-Patient Inst.


Decision time for Depature:  14:51


Referrals:  


[facility] (PCP)


Primary Care Physician








KING MOREIRA (Family)


Primary Care Physician


Patient Instructions:  COPD Exacerbation, Adult ED





Add. Discharge Instructions:  


1. Return to ER for any concerns


2. Steroids as directed and breathing treatments as directed.


Scripts


Cefuroxime Axetil (Cefuroxime) 250 Mg Tablet


250 MG PO BID, #10 TAB


   Prov: JORGE SALTER         21 


Prednisone (Prednisone) 20 Mg Tab


40 MG PO DAILY, #6 TAB 0 Refills


   Prov: JORGE SALTER         21











JORGE SALTER             2021 13:28

## 2021-06-20 NOTE — DIAGNOSTIC IMAGING REPORT
Clinical indications: Patient with chest pain.



Exam: Portable chest x-ray upright view.



Comparisons: Chest x-ray dated 04/23/2021.



Findings:



Lungs/pleura: Lungs are clear. There is no pneumothorax. There is

no pleural effusion.



Mediastinum: Unremarkable. 



Pulmonary vasculature: Unremarkable.



Heart: Unremarkable.



Bones/extrathoracic soft tissue: Unremarkable.



Impression:

There is no radiographic evidence of acute cardiopulmonary

process.



Dictated by: 



  Dictated on workstation # [id number]

## 2021-07-01 ENCOUNTER — HOSPITAL ENCOUNTER (OUTPATIENT)
Dept: HOSPITAL 75 - ONC | Age: 61
LOS: 4 days | Discharge: HOME | End: 2021-07-05
Attending: RADIOLOGY
Payer: COMMERCIAL

## 2021-07-01 DIAGNOSIS — E78.5: ICD-10-CM

## 2021-07-01 DIAGNOSIS — J30.9: ICD-10-CM

## 2021-07-01 DIAGNOSIS — C61: Primary | ICD-10-CM

## 2021-07-01 DIAGNOSIS — J44.9: ICD-10-CM

## 2021-07-01 PROCEDURE — 77338 DESIGN MLC DEVICE FOR IMRT: CPT

## 2021-07-01 PROCEDURE — 77295 3-D RADIOTHERAPY PLAN: CPT

## 2021-07-01 PROCEDURE — 77386: CPT

## 2021-07-01 PROCEDURE — 77385: CPT

## 2021-07-01 PROCEDURE — 77300 RADIATION THERAPY DOSE PLAN: CPT

## 2021-07-01 PROCEDURE — 77336 RADIATION PHYSICS CONSULT: CPT

## 2021-07-01 PROCEDURE — 77301 RADIOTHERAPY DOSE PLAN IMRT: CPT

## 2021-07-01 PROCEDURE — 99213 OFFICE O/P EST LOW 20 MIN: CPT

## 2021-10-02 ENCOUNTER — HOSPITAL ENCOUNTER (EMERGENCY)
Dept: HOSPITAL 75 - ER | Age: 61
Discharge: HOME | End: 2021-10-02
Payer: COMMERCIAL

## 2021-10-02 VITALS — WEIGHT: 250 LBS | HEIGHT: 70.98 IN | BODY MASS INDEX: 35 KG/M2

## 2021-10-02 VITALS — SYSTOLIC BLOOD PRESSURE: 127 MMHG | DIASTOLIC BLOOD PRESSURE: 71 MMHG

## 2021-10-02 DIAGNOSIS — F41.9: ICD-10-CM

## 2021-10-02 DIAGNOSIS — I10: ICD-10-CM

## 2021-10-02 DIAGNOSIS — J06.9: ICD-10-CM

## 2021-10-02 DIAGNOSIS — C61: Primary | ICD-10-CM

## 2021-10-02 DIAGNOSIS — J44.9: ICD-10-CM

## 2021-10-02 DIAGNOSIS — Z79.899: ICD-10-CM

## 2021-10-02 DIAGNOSIS — K21.9: ICD-10-CM

## 2021-10-02 DIAGNOSIS — Z20.822: ICD-10-CM

## 2021-10-02 DIAGNOSIS — F17.210: ICD-10-CM

## 2021-10-02 PROCEDURE — 99283 EMERGENCY DEPT VISIT LOW MDM: CPT

## 2021-10-02 PROCEDURE — 87636 SARSCOV2 & INF A&B AMP PRB: CPT

## 2021-10-02 NOTE — ED COUGH/URI
General


Chief Complaint:  COVID19 Suspect/Confirmed


Stated Complaint:  COUGH/HEADACHE


Nursing Triage Note:  


PT AMBULATE TO ROOM 10 WITHOUT DIFFICULTY WITH COUGH COUGH AND HEADACHE SINCE 


YESTERDAY. PT DENIES TAKING ANYTHING FOR THE PAIN.


Source:  patient (PT IS POOR HISTORIAN ABOUT PAST MEDICAL HISTORY AND DOES NOT 

KNOW ANY OF HIS MEDICATIONS)





History of Present Illness


Date Seen by Provider:  Oct 2, 2021


Time Seen by Provider:  18:00


Initial Comments


PT ARRIVES VIA POV FROM HOME


STATES HE STARTED FEELING ILL YESTERDAY


C/O NON-PRODUCTIVE COUGH


C/O FRONTAL HEADACHE--WORSE WITH COUGHING


C/O MUCH FATIGUE


C/O BODY ACHES


C/O SORE THROAT


NO GI SYMPTOMS


NO LOSS OF TASTE OR SMELL


PT UNAWARE OF FEVER--TEMP IS 99.2 HERE





PT HAS COPD, BUT NO SHORTNESS OF BREATH


USES BREO EVERY DAY, HAS NOT HAD TO USE RESCUE INHALER FOR A LONG TIME


PT CONTINUES TO SMOKE 1 1/2 PPD





NO CHEST PAIN


NO SWELLING IN LEGS OR PAIN IN CALVES


NO PALPITATIONS





PT HAS BEEN ON  VACATION IN [place] THIS WEEK, THEN WENT TO [facility] YESTERDAY 

AND THEN WENT TO [place] YESTERDAY AND GOT  YESTERDAY


WIFE IS NOT ILL


PT HAD QUYNH AND QUYNH VACCINE 05/05/21








PT WAS ALSO DX WITH PROSTATE CANCER EARLIER THIS YEAR


HAS COMPLETED EXTERNAL RADIATION AND HAS RADIATION SEED IMPLANTS/BRACHYTHERAPY


PT IS ON AN ORAL ANDROGEN BLOCKER, BUT HAS NOT HAD ANY OTHER CHEMOTHERAPY OR ANY

OTHER SURGERY FOR PROSTATE CANCER





PCP: KIZZY MOREIRA AT [facility]


UROLOGIST: DR. TAWIL





Allergies and Home Medications


Allergies


Coded Allergies:  


     No Known Drug Allergies (Unverified , 2/12/21)





Patient Home Medication List


Home Medication List Reviewed:  Yes


Albuterol Sulfate (Ventolin Hfa) 1 Puff Puff, 1 PUFF INH PRN, (Reported)


   Entered as Reported by: JULIE A IBEH on 2/5/21 1335


Cefuroxime Axetil (Cefuroxime) 250 Mg Tablet, 250 MG PO BID


   Prescribed by: JORGE SALTER on 6/20/21 1459


Cetirizine HCl (Cetirizine HCl) 10 Mg Tablet, 10 MG PO DAILY, (Reported)


   Entered as Reported by: JULIE A IBEH on 2/5/21 1335


Ciprofloxacin HCl (Cipro) 500 Mg Tablet, 500 MG PO BID


   Prescribed by: VICKIE POLLOCK on 2/12/21 0912


Doxycycline Hyclate (Doxycycline Hyclate) 100 Mg Tablet, 100 MG PO BID


   Prescribed by: BARBARA SELF on 10/2/21 1856


Escitalopram Oxalate (Lexapro) 10 Mg Tablet, 10 MG PO DAILY, (Reported)


   Entered as Reported by: JULIE A IBEH on 2/5/21 1335


Lisinopril/Hydrochlorothiazide (Lisinopril-Hctz 20-12.5 mg Tab) 1 Each Tablet, 1

EACH PO DAILY, (Reported)


   Entered as Reported by: JULIE A IBEH on 2/5/21 1335


Omeprazole Magnesium (Prilosec Otc) 20 Mg Tablet.dr, 20 MG PO DAILY, (Reported)


   Entered as Reported by: APOLLO TOSCANO on 9/21/16 2207


Phenazopyridine HCl (Pyridium) 200 Mg Tablet, 1 TAB PO TID PRN for PAIN-MILD (1-

4)


   Prescribed by: VICKIE POLLOCK on 2/12/21 0912


Prednisone (Prednisone) 20 Mg Tab, 40 MG PO DAILY


   Prescribed by: JORGE SALTER on 6/20/21 1459


Tramadol HCl (Tramadol HCl) 50 Mg Tablet, 1-2 TAB PO Q4H PRN for PAIN-MODERATE 

(5-7)


   Prescribed by: VICKIE POLLOCK on 2/12/21 0912





Review of Systems


Review of Systems


Constitutional:  no symptoms reported, fever, malaise, weakness, other (MUCH 

FATIGUE)


EENTM:  see HPI, throat pain; No nose congestion


Respiratory:  see HPI, cough; No short of breath, No wheezing


Cardiovascular:  no symptoms reported; No chest pain, No edema, No palpitations,

No syncope


Gastrointestinal:  no symptoms reported; No abdominal pain, No diarrhea, No loss

of appetite, No nausea, No vomiting


Genitourinary:  no symptoms reported; No dysuria


Musculoskeletal:  see HPI (BODY ACHES)


Skin:  no symptoms reported


Psychiatric/Neurological:  See HPI, Headache; Denies Numbness, Denies 

Paresthesia, Denies Tingling, Denies Weakness


Hematologic/Lymphatic:  No Symptoms Reported


Immunological/Allergic:  no symptoms reported





Past Medical-Social-Family Hx


Patient Social History


Tobacco Use?:  Yes (1 1/2 PPD)


Tobacco type used:  Cigarettes


Smoking Status:  Current Everyday Smoker


Smokeless Tobacco Frequency:  Never a User


Use of E-Cig and/or Vaping Daniel:  Never a User


Substance use?:  No


Alcohol Use?:  Yes (HISTORY, NONE FOR 20 YEARS)





Immunizations Up To Date


First/Initial COVID19 Vaccinat:  05/05/2021


COVID19 Vaccine :  [employer]





Seasonal Allergies


Seasonal Allergies:  No





Past Medical History


Surgery/Hospitalization HX:  


BRACHYTHERAPY/RADIATION SEED IMPLANTS FOR PROSTATE CANCER 02/2021 BY DR. TAWIL


CYSTOSCOPIES AND PROSTATE BIOPSY


TEETH REMOVED


ROTATOR CUFF SURGERY


Surgeries:  Yes (Prostate biopsy)


Orthopedic


Respiratory:  Yes


COPD


Currently Using CPAP:  Yes


Currently Using BIPAP:  No


Cardiac:  Yes


High Cholesterol, Hypertension


Neurological:  No


Reproductive Disorders:  No


Sexually Transmitted Disease:  No


HIV/AIDS:  No


Genitourinary:  Yes (PROSTATE CANCER DX 2021)


Gastrointestinal:  Yes


Abdominal Hernia, Gastroesophageal Reflux


Musculoskeletal:  Yes (ROTATOR CUFF)


Endocrine:  No


HEENT:  Yes (TEETH REMOVED. WEARS GLASSES)


Cancer:  Yes


Prostate


Did You Recieve Any Treatments:  Yes


What Type of Treatment Did You:  Radiation





PROSTATE CANCER DX EARLY 2021


HAS COMPLETED EXTERNAL BEAM RADIATION AND HAS HAD BRACHYTHERAPY/RADIATION


SEED IMPLAINTS


PT IS ON ORAL ANDROGEN BLOCKER


Psychosocial:  Yes


Anxiety


Integumentary:  No


Blood Disorders:  No


Adverse Reaction/Blood Tranf:  No





Family Medical History





Arthritis


  G8 BROTHER


Cardiovascular disease


  19 FATHER


Cataracts


  19 FATHER


Colon cancer


  19 FATHER


Deafness or hearing loss


  19 FATHER


Diabetes mellitus


  19 FATHER


Hypercholesterolemia


  19 FATHER


Hypertension


  19 FATHER


Myocardial infarction


  19 FATHER


  G8 BROTHER


Respiratory disorder


  G8 BROTHER


CAD Over 55 Years Old





Physical Exam





Vital Signs - First Documented








 10/2/21





 17:57


 


Temp 37.3


 


Pulse 72


 


Resp 17


 


B/P (MAP) 128/79 (95)


 


O2 Delivery Room Air





Capillary Refill : Less Than 3 Seconds


Height: 5'11.00"


Weight: 259lbs. 1.0oz. [id number]; 34.00 BMI


Method:Stated


General Appearance:  WD/WN, no apparent distress, other (DOES NOT APPEAR ILL OR 

TO BE IN ANY DISCOMFORT OR DISTRESS.)


HEENT:  PERRL/EOMI, normal ENT inspection, TMs normal, pharynx normal, other 

(EDENTULOUS)


Neck:  normal inspection


Respiratory:  normal breath sounds, no respiratory distress, no accessory muscle

use


Cardiovascular:  normal peripheral pulses, regular rate, rhythm, no edema, no 

JVD, no murmur


Gastrointestinal:  non tender, soft


Extremities:  normal inspection, no pedal edema, no calf tenderness, normal 

capillary refill


Neurologic/Psychiatric:  CNs II-XII nml as tested, no motor/sensory deficits, 

alert, normal mood/affect, oriented x 3


Skin:  normal color, warm/dry





Progress/Results/Core Measures


Suspected Sepsis


SIRS


Temperature: 


Pulse: 72 


Respiratory Rate: 17


 


Blood Pressure 128 /79 


Mean: 95





Results/Orders


Lab Results





Laboratory Tests








Test


 10/2/21


18:08 Range/Units


 


 


Influenza Type A (RT-PCR) Not Detected  Not Detecte  


 


Influenza Type B (RT-PCR) Not Detected  Not Detecte  


 


SARS-CoV-2 RNA (RT-PCR) Not Detected  Not Detecte  








My Orders





Orders - BARBARA SELF DO


Influenza A And B By Pcr (10/2/21 18:00)


Covid 19 Inhouse Test (10/2/21 18:00)


Rx-Doxycycline Tablet (Rx-Vibramycin Tab (10/2/21 18:57)





Vital Signs/I&O











 10/2/21 10/2/21





 17:57 18:01


 


Temp 37.3 


 


Pulse 72 


 


Resp 17 


 


B/P (MAP) 128/79 (95) 


 


O2 Delivery Room Air Room Air





Capillary Refill : Less Than 3 Seconds








Blood Pressure Mean:                    95








Progress Note :  


Progress Note


PLACED IN ISOLATION ROOM


PPE WORN AT ALL TIMES


COVID-19  TESTING PERFORMED


DISCUSSED NEED FOR QUARANTINE, AND NEED FOR REPEAT TESTING IN 2-3 DAYS IF TEST 

HERE IN ER TONIGHT IS NEGATIVE 


ALSO DISCUSSED REGENERON THERAPY IF HE TESTS POSITIVE, AND PT WOULD LIKE TO 

PROCEED WITH THAT TREATMENT IF TEST IS POSITIVE. 





NO COUGH


NO HYPOXIA


NO DYSPNEA


TEMP IS 99.2 HERE, NO REPORTED FEVER AT HOME


VITALS OTHERWISE NORMAL.





Departure


Impression





   Primary Impression:  


   Upper respiratory infection


   Additional Impressions:  


   COPD


   Prostate cancer


Disposition:  01 HOME, SELF-CARE


Condition:  Stable





Departure-Patient Inst.


Decision time for Depature:  18:54


Referrals:  


[facility] RAFAELA VINCENT (PCP)


Primary Care Physician








KING MOREIRA (Family)


Primary Care Physician


Patient Instructions:  COVID-19 Overview, Upper Respiratory Infection ED





Add. Discharge Instructions:  


LOTS OF CLEAR LIQUIDS





TYLENOL AND MOTRIN AS NEEDED FOR PAIN OR FEVER OVER 101





OVER THE COUNTER MUCINEX DM FOR COUGH





QUARANTINE YOURSELF AND ALL HOUSEHOLD AND CLOSE CONTACTS AT THIS TIME, UNTIL 

CLEARED BY DR. DAVE UP WITH [facility] IN 2-3 DAYS FOR RECHECK AND POSSIBLE RE-TESTING FOR 

COVID-19





RETURN TO ER IF SYMPTOMS WORSEN





All discharge instructions reviewed with patient and/or family. Voiced 

understanding.


Scripts


Doxycycline Hyclate (Doxycycline Hyclate) 100 Mg Tablet


100 MG PO BID, #20 TAB 0 Refills


   Prov: BARBARA SELF DO         10/2/21











BARBARA SELF DO                  Oct 2, 2021 18:27

## 2021-12-12 ENCOUNTER — HOSPITAL ENCOUNTER (EMERGENCY)
Dept: HOSPITAL 75 - ER | Age: 61
Discharge: HOME | End: 2021-12-12
Payer: COMMERCIAL

## 2021-12-12 VITALS — WEIGHT: 249.12 LBS | HEIGHT: 70.87 IN | BODY MASS INDEX: 34.88 KG/M2

## 2021-12-12 VITALS — SYSTOLIC BLOOD PRESSURE: 121 MMHG | DIASTOLIC BLOOD PRESSURE: 81 MMHG

## 2021-12-12 DIAGNOSIS — F41.9: ICD-10-CM

## 2021-12-12 DIAGNOSIS — Z20.822: ICD-10-CM

## 2021-12-12 DIAGNOSIS — J44.1: Primary | ICD-10-CM

## 2021-12-12 DIAGNOSIS — I10: ICD-10-CM

## 2021-12-12 DIAGNOSIS — K21.9: ICD-10-CM

## 2021-12-12 DIAGNOSIS — F17.210: ICD-10-CM

## 2021-12-12 DIAGNOSIS — Z79.899: ICD-10-CM

## 2021-12-12 LAB
ALBUMIN SERPL-MCNC: 3.9 GM/DL (ref 3.2–4.5)
ALP SERPL-CCNC: 100 U/L (ref 40–136)
ALT SERPL-CCNC: 18 U/L (ref 0–55)
BASOPHILS # BLD AUTO: 0 10^3/UL (ref 0–0.1)
BASOPHILS NFR BLD AUTO: 0 % (ref 0–10)
BILIRUB SERPL-MCNC: 0.4 MG/DL (ref 0.1–1)
BUN/CREAT SERPL: 12
CALCIUM SERPL-MCNC: 9.5 MG/DL (ref 8.5–10.1)
CHLORIDE SERPL-SCNC: 105 MMOL/L (ref 98–107)
CO2 SERPL-SCNC: 21 MMOL/L (ref 21–32)
CREAT SERPL-MCNC: 1.29 MG/DL (ref 0.6–1.3)
EOSINOPHIL # BLD AUTO: 0.2 10^3/UL (ref 0–0.3)
EOSINOPHIL NFR BLD AUTO: 2 % (ref 0–10)
GFR SERPLBLD BASED ON 1.73 SQ M-ARVRAT: 57 ML/MIN
GLUCOSE SERPL-MCNC: 126 MG/DL (ref 70–105)
HCT VFR BLD CALC: 37 % (ref 40–54)
HGB BLD-MCNC: 12.2 G/DL (ref 13.3–17.7)
LYMPHOCYTES # BLD AUTO: 1.8 10^3/UL (ref 1–4)
LYMPHOCYTES NFR BLD AUTO: 20 % (ref 12–44)
MANUAL DIFFERENTIAL PERFORMED BLD QL: NO
MCH RBC QN AUTO: 31 PG (ref 25–34)
MCHC RBC AUTO-ENTMCNC: 33 G/DL (ref 32–36)
MCV RBC AUTO: 92 FL (ref 80–99)
MONOCYTES # BLD AUTO: 0.7 10^3/UL (ref 0–1)
MONOCYTES NFR BLD AUTO: 8 % (ref 0–12)
NEUTROPHILS # BLD AUTO: 6.2 10^3/UL (ref 1.8–7.8)
NEUTROPHILS NFR BLD AUTO: 69 % (ref 42–75)
PLATELET # BLD: 133 10^3/UL (ref 130–400)
PMV BLD AUTO: 11.9 FL (ref 9–12.2)
POTASSIUM SERPL-SCNC: 4.1 MMOL/L (ref 3.6–5)
PROT SERPL-MCNC: 6.7 GM/DL (ref 6.4–8.2)
SODIUM SERPL-SCNC: 137 MMOL/L (ref 135–145)
WBC # BLD AUTO: 9 10^3/UL (ref 4.3–11)

## 2021-12-12 PROCEDURE — 85025 COMPLETE CBC W/AUTO DIFF WBC: CPT

## 2021-12-12 PROCEDURE — 36415 COLL VENOUS BLD VENIPUNCTURE: CPT

## 2021-12-12 PROCEDURE — 71045 X-RAY EXAM CHEST 1 VIEW: CPT

## 2021-12-12 PROCEDURE — 96361 HYDRATE IV INFUSION ADD-ON: CPT

## 2021-12-12 PROCEDURE — 84145 PROCALCITONIN (PCT): CPT

## 2021-12-12 PROCEDURE — 96374 THER/PROPH/DIAG INJ IV PUSH: CPT

## 2021-12-12 PROCEDURE — 87636 SARSCOV2 & INF A&B AMP PRB: CPT

## 2021-12-12 PROCEDURE — 80053 COMPREHEN METABOLIC PANEL: CPT

## 2021-12-12 NOTE — DIAGNOSTIC IMAGING REPORT
EXAMINATION: Chest 1 view



HISTORY: Cough



COMPARISON: 06/20/2021



FINDINGS: 



The lungs are clear without edema or pneumonia. No pleural

effusion or pneumothorax. Heart size is normal.



IMPRESSION: 



1. Clear lungs.



Dictated by: 



  Dictated on workstation # [id number]

## 2021-12-12 NOTE — ED GENERAL
General


Chief Complaint:  COVID19 Suspect/Confirmed


Stated Complaint:  COUGH/SOA/SORE THROAT/DIARRHEA/CONGESTION/HEADACHE


Source of Information:  Patient


Exam Limitations:  No Limitations





History of Present Illness


Date Seen by Provider:  Dec 12, 2021


Time Seen by Provider:  10:50


Initial Comments


ER with a 3-day history of productive cough.  Has subsequently developed a 

headache, body aches, diarrhea.  Denies shortness of breath.  Had a [employer] 

[employer] Covid vaccine in May.  Smokes 1.5 pack of cigarettes per day.  He is 

employed as a  and states that he will get coughing fits so bad that

he nearly passes out.


Timing/Duration:  2-3 Days


Severity:  Moderate


Associated Systoms:  Cough, Headaches, Malaise; No Nausea/Vomiting





Allergies and Home Medications


Allergies


Coded Allergies:  


     No Known Drug Allergies (Unverified , 2/12/21)





Patient Home Medication List


Home Medication List Reviewed:  Yes


Acetaminophen with Codeine (Acetaminophen-Cod #3 Tablet) 1 Each Tablet, 1 EACH 

PO Q6H PRN for COUGH


   Prescribed by: JORGE SALTER on 12/12/21 1131


Albuterol Sulfate (Ventolin Hfa) 1 Puff Puff, 1 PUFF INH PRN, (Reported)


   Entered as Reported by: JULIE A IBEH on 2/5/21 1335


Cefuroxime Axetil (Cefuroxime) 250 Mg Tablet, 250 MG PO BID


   Prescribed by: JORGE SALTER on 6/20/21 1459


Cefuroxime Axetil (Cefuroxime) 500 Mg Tablet, 500 MG PO BID


   Prescribed by: JORGE SALTER on 12/12/21 1130


Cetirizine HCl (Cetirizine HCl) 10 Mg Tablet, 10 MG PO DAILY, (Reported)


   Entered as Reported by: JULIE A IBEH on 2/5/21 1335


Ciprofloxacin HCl (Cipro) 500 Mg Tablet, 500 MG PO BID


   Prescribed by: VICKIE POLLOCK on 2/12/21 0912


Doxycycline Hyclate (Doxycycline Hyclate) 100 Mg Tablet, 100 MG PO BID


   Prescribed by: BARBARA SELF on 10/2/21 1856


Escitalopram Oxalate (Lexapro) 10 Mg Tablet, 10 MG PO DAILY, (Reported)


   Entered as Reported by: JULIE A IBEH on 2/5/21 1335


Lisinopril/Hydrochlorothiazide (Lisinopril-Hctz 20-12.5 mg Tab) 1 Each Tablet, 1

EACH PO DAILY, (Reported)


   Entered as Reported by: JULIE A IBEH on 2/5/21 1335


Omeprazole Magnesium (Prilosec Otc) 20 Mg Tablet.dr, 20 MG PO DAILY, (Reported)


   Entered as Reported by: APOLLO TOSCANO on 9/21/16 2207


Phenazopyridine HCl (Pyridium) 200 Mg Tablet, 1 TAB PO TID PRN for PAIN-MILD (1-

4)


   Prescribed by: VICKIE POLLOCK on 2/12/21 0912


Prednisone (Prednisone) 20 Mg Tab, 40 MG PO DAILY


   Prescribed by: JORGE SALTER on 6/20/21 1459


Prednisone (Prednisone) 20 Mg Tab, 40 MG PO DAILY


   Prescribed by: JORGE SALTER on 12/12/21 1130


Tramadol HCl (Tramadol HCl) 50 Mg Tablet, 1-2 TAB PO Q4H PRN for PAIN-MODERATE 

(5-7)


   Prescribed by: VICKIE POLLOCK on 2/12/21 0912





Review of Systems


Review of Systems


Constitutional:  see HPI, malaise


EENTM:  see HPI


Respiratory:  see HPI, cough


Cardiovascular:  no symptoms reported


Gastrointestinal:  diarrhea


Genitourinary:  no symptoms reported


Musculoskeletal:  no symptoms reported


Skin:  no symptoms reported


Psychiatric/Neurological:  No Symptoms Reported


Hematologic/Lymphatic:  No Symptoms Reported





Past Medical-Social-Family Hx


Immunizations Up To Date


First/Initial COVID19 Vaccinat:  05/05/2021





Seasonal Allergies


Seasonal Allergies:  No





Past Medical History


Surgery/Hospitalization HX:  


BRACHYTHERAPY/RADIATION SEED IMPLANTS FOR PROSTATE CANCER 02/2021 BY DR. TAWIL


CYSTOSCOPIES AND PROSTATE BIOPSY


TEETH REMOVED


ROTATOR CUFF SURGERY


Surgeries:  Yes (Prostate biopsy)


Orthopedic


Respiratory:  Yes


COPD


Currently Using CPAP:  Yes


Currently Using BIPAP:  No


Cardiac:  Yes


High Cholesterol, Hypertension


Neurological:  No


Reproductive Disorders:  No


Sexually Transmitted Disease:  No


HIV/AIDS:  No


Genitourinary:  Yes (PROSTATE CANCER DX 2021)


Gastrointestinal:  Yes


Abdominal Hernia, Gastroesophageal Reflux


Musculoskeletal:  Yes (ROTATOR CUFF)


Endocrine:  No


HEENT:  Yes (TEETH REMOVED. WEARS GLASSES)


Cancer:  Yes


Prostate


Did You Recieve Any Treatments:  Yes


What Type of Treatment Did You:  Radiation


Psychosocial:  Yes


Anxiety


Integumentary:  No


Blood Disorders:  No


Adverse Reaction/Blood Tranf:  No





Family Medical History





Arthritis


  G8 BROTHER


Cardiovascular disease


  19 FATHER


Cataracts


  19 FATHER


Colon cancer


  19 FATHER


Deafness or hearing loss


  19 FATHER


Diabetes mellitus


  19 FATHER


Hypercholesterolemia


  19 FATHER


Hypertension


  19 FATHER


Myocardial infarction


  19 FATHER


  G8 BROTHER


Respiratory disorder


  G8 BROTHER


CAD Over 55 Years Old





Physical Exam


Vital Signs





Vital Signs - First Documented








 12/12/21





 10:40


 


Temp 35.7


 


Pulse 64


 


Resp 16


 


B/P (MAP) 132/75 (94)


 


Pulse Ox 98


 


O2 Delivery Room Air





Capillary Refill :


Height, Weight, BMI


Height: 5'11.00"


Weight: 259lbs. 1.0oz. 117.272571mv; 34.00 BMI


Method:Stated


General Appearance:  No Apparent Distress, WD/WN, Other (Alert and oriented no 

distress heart rate 64 oxygen 98% room air)


Eyes:  Bilateral Eye Normal Inspection, Bilateral Eye PERRL, Bilateral Eye EOMI


Neck:  Full Range of Motion, Normal Inspection


Respiratory:  No Accessory Muscle Use, No Respiratory Distress


Cardiovascular:  Regular Rate, Rhythm, Normal Peripheral Pulses


Gastrointestinal:  Normal Bowel Sounds, Non Tender, Soft


Extremity:  Normal Capillary Refill, Normal Inspection


Neurologic/Psychiatric:  Alert, Oriented x3


Skin:  Normal Color, Warm/Dry





Progress/Results/Core Measures


Suspected Sepsis


SIRS


Temperature: 


Pulse:  


Respiratory Rate: 


 


Laboratory Tests


12/12/21 10:42: White Blood Count 9.0


Blood Pressure  / 


Mean: 


 





Laboratory Tests


12/12/21 10:42: 


Creatinine 1.29, Platelet Count 133, Total Bilirubin 0.4








Results/Orders


Lab Results





Laboratory Tests








Test


 12/12/21


10:42 Range/Units


 


 


White Blood Count


 9.0 


 4.3-11.0


10^3/uL


 


Red Blood Count


 4.00 L


 4.30-5.52


10^6/uL


 


Hemoglobin 12.2 L 13.3-17.7  g/dL


 


Hematocrit 37 L 40-54  %


 


Mean Corpuscular Volume 92  80-99  fL


 


Mean Corpuscular Hemoglobin 31  25-34  pg


 


Mean Corpuscular Hemoglobin


Concent 33 


 32-36  g/dL





 


Red Cell Distribution Width 13.7  10.0-14.5  %


 


Platelet Count


 133 


 130-400


10^3/uL


 


Mean Platelet Volume 11.9  9.0-12.2  fL


 


Immature Granulocyte % (Auto) 0   %


 


Neutrophils (%) (Auto) 69  42-75  %


 


Lymphocytes (%) (Auto) 20  12-44  %


 


Monocytes (%) (Auto) 8  0-12  %


 


Eosinophils (%) (Auto) 2  0-10  %


 


Basophils (%) (Auto) 0  0-10  %


 


Neutrophils # (Auto)


 6.2 


 1.8-7.8


10^3/uL


 


Lymphocytes # (Auto)


 1.8 


 1.0-4.0


10^3/uL


 


Monocytes # (Auto)


 0.7 


 0.0-1.0


10^3/uL


 


Eosinophils # (Auto)


 0.2 


 0.0-0.3


10^3/uL


 


Basophils # (Auto)


 0.0 


 0.0-0.1


10^3/uL


 


Immature Granulocyte # (Auto)


 0.0 


 0.0-0.1


10^3/uL


 


Sodium Level 137  135-145  MMOL/L


 


Potassium Level 4.1  3.6-5.0  MMOL/L


 


Chloride Level 105    MMOL/L


 


Carbon Dioxide Level 21  21-32  MMOL/L


 


Anion Gap 11  5-14  MMOL/L


 


Blood Urea Nitrogen 15  7-18  MG/DL


 


Creatinine


 1.29 


 0.60-1.30


MG/DL


 


Estimat Glomerular Filtration


Rate 57 


  





 


BUN/Creatinine Ratio 12   


 


Glucose Level 126 H   MG/DL


 


Calcium Level 9.5  8.5-10.1  MG/DL


 


Corrected Calcium 9.6  8.5-10.1  MG/DL


 


Total Bilirubin 0.4  0.1-1.0  MG/DL


 


Aspartate Amino Transf


(AST/SGOT) 21 


 5-34  U/L





 


Alanine Aminotransferase


(ALT/SGPT) 18 


 0-55  U/L





 


Alkaline Phosphatase 100    U/L


 


Total Protein 6.7  6.4-8.2  GM/DL


 


Albumin 3.9  3.2-4.5  GM/DL


 


Procalcitonin 0.03  <0.10  NG/ML


 


Influenza Type A (RT-PCR) Not Detected  Not Detecte  


 


Influenza Type B (RT-PCR) Not Detected  Not Detecte  


 


SARS-CoV-2 RNA (RT-PCR) Not Detected  Not Detecte  








My Orders





Orders - JORGE SALTER


Covid 19 Inhouse Test (12/12/21 10:37)


Influenza A And B By Pcr (12/12/21 10:37)


Lactated Ringers (Lr 1000 Ml Iv Solution (12/12/21 11:00)


Ketorolac Injection (Toradol Injection) (12/12/21 11:00)


Cbc With Automated Diff (12/12/21 10:49)


Comprehensive Metabolic Panel (12/12/21 10:49)


Chest 1 View, Ap/Pa Only (12/12/21 10:49)


Procalcitonin (Pct) (12/12/21 10:49)


Prednisone Tablet (Deltasone Tablet) (12/12/21 11:30)





Medications Given in ED





Current Medications








 Medications  Dose


 Ordered  Sig/Vitor


 Route  Start Time


 Stop Time Status Last Admin


Dose Admin


 


 Ketorolac


 Tromethamine  15 mg  ONCE  ONCE


 IVP  12/12/21 11:00


 12/12/21 11:01 DC 12/12/21 11:02


15 MG








Vital Signs/I&O











 12/12/21





 10:40


 


Temp 35.7


 


Pulse 64


 


Resp 16


 


B/P (MAP) 132/75 (94)


 


Pulse Ox 98


 


O2 Delivery Room Air





Capillary Refill :





Departure


Impression





   Primary Impression:  


   COPD exacerbation


Disposition:  01 HOME, SELF-CARE


Condition:  Stable





Departure-Patient Inst.


Decision time for Depature:  11:28


Referrals:  


[facility] (PCP)


Primary Care Physician








KING MOREIRA (Family)


Primary Care Physician


Patient Instructions:  Chronic Obstructive Pulmonary Disease (COPD), Including 

Emphysema





Add. Discharge Instructions:  


1.  Steroids and antibiotic as directed.  Take the cough medication as needed 

but be aware it can cause some sleepiness and you should not drive for 4 hours 

after taking it.


Scripts


Acetaminophen with Codeine (Acetaminophen-Cod #3 Tablet) 1 Each Tablet


1 EACH PO Q6H PRN for COUGH for 7 Days, #10 TAB


   Prov: JORGE SALTER         12/12/21 


Prednisone (Prednisone) 20 Mg Tab


40 MG PO DAILY, #6 TAB 0 Refills


   Prov: JORGE SALTER         12/12/21 


Cefuroxime Axetil (Cefuroxime) 500 Mg Tablet


500 MG PO BID, #10 TAB


   Prov: JORGE SALTER         12/12/21


Work/School Note:  Work Release Form   Date Seen in the Emergency Department:  

Dec 12, 2021


   Return to Work:  Dec 15, 2021











JORGE SALTER             Dec 12, 2021 10:52

## 2022-10-04 ENCOUNTER — HOSPITAL ENCOUNTER (OUTPATIENT)
Dept: HOSPITAL 75 - WOUNDCARE | Age: 62
End: 2022-10-04
Attending: FAMILY MEDICINE
Payer: COMMERCIAL

## 2022-10-04 DIAGNOSIS — S60.522A: ICD-10-CM

## 2022-10-04 DIAGNOSIS — S60.521A: ICD-10-CM

## 2022-10-04 DIAGNOSIS — T65.292A: ICD-10-CM

## 2022-10-04 DIAGNOSIS — L13.9: ICD-10-CM

## 2022-10-04 DIAGNOSIS — E66.01: ICD-10-CM

## 2022-10-04 DIAGNOSIS — S90.821A: Primary | ICD-10-CM

## 2022-10-04 DIAGNOSIS — L97.522: ICD-10-CM

## 2022-10-04 PROCEDURE — 86038 ANTINUCLEAR ANTIBODIES: CPT

## 2022-10-04 PROCEDURE — 86431 RHEUMATOID FACTOR QUANT: CPT

## 2022-10-04 PROCEDURE — 85652 RBC SED RATE AUTOMATED: CPT

## 2022-10-04 PROCEDURE — 11042 DBRDMT SUBQ TIS 1ST 20SQCM/<: CPT

## 2022-10-04 PROCEDURE — 86141 C-REACTIVE PROTEIN HS: CPT

## 2022-10-04 PROCEDURE — 36415 COLL VENOUS BLD VENIPUNCTURE: CPT

## 2022-10-10 ENCOUNTER — HOSPITAL ENCOUNTER (OUTPATIENT)
Dept: HOSPITAL 75 - WOUNDCARE | Age: 62
End: 2022-10-10
Attending: FAMILY MEDICINE
Payer: COMMERCIAL

## 2022-10-10 DIAGNOSIS — L13.9: Primary | ICD-10-CM

## 2022-10-10 DIAGNOSIS — E66.01: ICD-10-CM

## 2022-10-10 DIAGNOSIS — T65.292A: ICD-10-CM

## 2022-10-10 PROCEDURE — 99212 OFFICE O/P EST SF 10 MIN: CPT

## 2023-08-26 ENCOUNTER — HOSPITAL ENCOUNTER (EMERGENCY)
Dept: HOSPITAL 75 - ER | Age: 63
Discharge: HOME | End: 2023-08-26
Payer: COMMERCIAL

## 2023-08-26 VITALS — SYSTOLIC BLOOD PRESSURE: 103 MMHG | DIASTOLIC BLOOD PRESSURE: 53 MMHG

## 2023-08-26 DIAGNOSIS — Z28.311: ICD-10-CM

## 2023-08-26 DIAGNOSIS — Z99.89: ICD-10-CM

## 2023-08-26 DIAGNOSIS — Z87.19: ICD-10-CM

## 2023-08-26 DIAGNOSIS — J44.9: ICD-10-CM

## 2023-08-26 DIAGNOSIS — R10.13: Primary | ICD-10-CM

## 2023-08-26 LAB
ALBUMIN SERPL-MCNC: 4.2 GM/DL (ref 3.2–4.5)
ALP SERPL-CCNC: 82 U/L (ref 40–136)
ALT SERPL-CCNC: 16 U/L (ref 0–55)
BASOPHILS # BLD AUTO: 0 10^3/UL (ref 0–0.1)
BASOPHILS NFR BLD AUTO: 0 % (ref 0–10)
BILIRUB SERPL-MCNC: 0.8 MG/DL (ref 0.1–1)
BUN/CREAT SERPL: 14
CALCIUM SERPL-MCNC: 9 MG/DL (ref 8.5–10.1)
CHLORIDE SERPL-SCNC: 103 MMOL/L (ref 98–107)
CO2 SERPL-SCNC: 20 MMOL/L (ref 21–32)
CREAT SERPL-MCNC: 1.4 MG/DL (ref 0.6–1.3)
EOSINOPHIL # BLD AUTO: 0.1 10^3/UL (ref 0–0.3)
EOSINOPHIL NFR BLD AUTO: 1 % (ref 0–10)
GFR SERPLBLD BASED ON 1.73 SQ M-ARVRAT: 56 ML/MIN
GLUCOSE SERPL-MCNC: 114 MG/DL (ref 70–105)
HCT VFR BLD CALC: 39 % (ref 40–54)
HGB BLD-MCNC: 12.9 G/DL (ref 13.3–17.7)
LIPASE SERPL-CCNC: 29 U/L (ref 8–78)
LYMPHOCYTES # BLD AUTO: 1.1 10^3/UL (ref 1–4)
LYMPHOCYTES NFR BLD AUTO: 11 % (ref 12–44)
MANUAL DIFFERENTIAL PERFORMED BLD QL: NO
MCH RBC QN AUTO: 31 PG (ref 25–34)
MCHC RBC AUTO-ENTMCNC: 33 G/DL (ref 32–36)
MCV RBC AUTO: 92 FL (ref 80–99)
MONOCYTES # BLD AUTO: 0.8 10^3/UL (ref 0–1)
MONOCYTES NFR BLD AUTO: 8 % (ref 0–12)
NEUTROPHILS # BLD AUTO: 7.7 10^3/UL (ref 1.8–7.8)
NEUTROPHILS NFR BLD AUTO: 80 % (ref 42–75)
PLATELET # BLD: 167 10^3/UL (ref 130–400)
PMV BLD AUTO: 10.6 FL (ref 9–12.2)
POTASSIUM SERPL-SCNC: 3.6 MMOL/L (ref 3.6–5)
PROT SERPL-MCNC: 6.8 GM/DL (ref 6.4–8.2)
SODIUM SERPL-SCNC: 134 MMOL/L (ref 135–145)
WBC # BLD AUTO: 9.7 10^3/UL (ref 4.3–11)

## 2023-08-26 PROCEDURE — 80053 COMPREHEN METABOLIC PANEL: CPT

## 2023-08-26 PROCEDURE — 36415 COLL VENOUS BLD VENIPUNCTURE: CPT

## 2023-08-26 PROCEDURE — 83690 ASSAY OF LIPASE: CPT

## 2023-08-26 PROCEDURE — 85025 COMPLETE CBC W/AUTO DIFF WBC: CPT

## 2023-08-26 NOTE — ED ABDOMINAL PAIN
General


Chief Complaint:  Abdominal/GI Problems


Stated Complaint:  AB PAIN


Source of Information:  Patient


Exam Limitations:  No Limitations





History of Present Illness


Date Seen by Provider:  Aug 26, 2023


Time Seen by Provider:  14:59


Initial Comments


63-year-old male presents to the emergency department today for epigastric 

abdominal pain.  Symptoms present for about a week and initially were 

off-and-on, dull cramping without radiation.  It does seem to come on after food

intake.  Last night after he ate chili dogs that became constant and severe 

which was the reason presentation.  He denies any nausea or vomiting.  No fevers

or chills.  No changes in bowel or bladder habits.  He is never had any intra-

abdominal surgeries.  He does not drink alcohol.  He does have acid reflux, 

takes a daily PPI.  He also has a history of recent anemia and has an 

appointment next week for an upper and lower endoscopy to check for source of 

bleeding.





All other systems reviewed and negative except documented per HPI.





Voice recognition software was used to help create this chart





Allergies and Home Medications


Allergies


Coded Allergies:  


     No Known Drug Allergies (Unverified , 2/12/21)





Patient Home Medication List


Home Medication List Reviewed:  Yes


Acetaminophen with Codeine (Acetaminophen-Cod #3 Tablet) 1 Each Tablet, 1 EACH 

PO Q6H PRN for COUGH


   Prescribed by: JORGE SALTER on 12/12/21 1131


Albuterol Sulfate (Ventolin Hfa) 1 Puff Puff, 1 PUFF INH PRN, (Reported)


   Entered as Reported by: JULIE A IBEH on 2/5/21 1335


Cefuroxime Axetil (Cefuroxime) 250 Mg Tablet, 250 MG PO BID


   Prescribed by: JORGE SALTER on 6/20/21 1459


Cefuroxime Axetil (Cefuroxime) 500 Mg Tablet, 500 MG PO BID


   Prescribed by: JORGE SALTER on 12/12/21 1130


Cetirizine HCl (Cetirizine HCl) 10 Mg Tablet, 10 MG PO DAILY, (Reported)


   Entered as Reported by: JULIE A IBEH on 2/5/21 1335


Ciprofloxacin HCl (Cipro) 500 Mg Tablet, 500 MG PO BID


   Prescribed by: VICKIE POLLOKC on 2/12/21 0912


Doxycycline Hyclate (Doxycycline Hyclate) 100 Mg Tablet, 100 MG PO BID


   Prescribed by: BARBARA SELF on 10/2/21 1856


Escitalopram Oxalate (Lexapro) 10 Mg Tablet, 10 MG PO DAILY, (Reported)


   Entered as Reported by: JULIE A IBEH on 2/5/21 1335


Lisinopril/Hydrochlorothiazide (Lisinopril-Hctz 20-12.5 mg Tab) 1 Each Tablet, 1

EACH PO DAILY, (Reported)


   Entered as Reported by: JULIE A IBEH on 2/5/21 1335


Omeprazole Magnesium (Prilosec Otc) 20 Mg Tablet.dr, 20 MG PO DAILY, (Reported)


   Entered as Reported by: APOLLO TOSCANO on 9/21/16 2207


Phenazopyridine HCl (Pyridium) 200 Mg Tablet, 1 TAB PO TID PRN for PAIN-MILD (1-

4)


   Prescribed by: VICKIE POLLOCK on 2/12/21 0912


Prednisone (Prednisone) 20 Mg Tab, 40 MG PO DAILY


   Prescribed by: JORGE SALTER on 6/20/21 1459


Prednisone (Prednisone) 20 Mg Tab, 40 MG PO DAILY


   Prescribed by: JORGE SALTER on 12/12/21 1130


Tramadol HCl (Tramadol HCl) 50 Mg Tablet, 1-2 TAB PO Q4H PRN for PAIN-MODERATE 

(5-7)


   Prescribed by: VICKIE POLLOCK on 2/12/21 0912





Review of Systems


Review of Systems


Constitutional:  see HPI





Past Medical-Social-Family Hx


Patient Social History


Tobacco Use?:  No


Use of E-Cig and/or Vaping dev:  No


Substance use?:  No


Alcohol Use?:  No





Immunizations Up To Date


First/Initial COVID19 Vaccinat:  05/21





Seasonal Allergies


Seasonal Allergies:  No





Past Medical History


Surgery/Hospitalization HX:  


BRACHYTHERAPY/RADIATION SEED IMPLANTS FOR PROSTATE CANCER 02/2021 BY DR. TAWIL


CYSTOSCOPIES AND PROSTATE BIOPSY


TEETH REMOVED


ROTATOR CUFF SURGERY


Surgeries:  Yes (Prostate biopsy)


Orthopedic


Respiratory:  Yes


COPD


Currently Using CPAP:  Yes


Currently Using BIPAP:  No


Cardiac:  Yes


High Cholesterol, Hypertension


Neurological:  No


Reproductive Disorders:  No


Sexually Transmitted Disease:  No


HIV/AIDS:  No


Genitourinary:  Yes (PROSTATE CANCER DX 2021)


Gastrointestinal:  Yes


Abdominal Hernia, Gastroesophageal Reflux


Musculoskeletal:  Yes (ROTATOR CUFF)


Endocrine:  No


HEENT:  Yes (TEETH REMOVED. WEARS GLASSES)


Cancer:  Yes


Prostate


Did You Recieve Any Treatments:  Yes


What Type of Treatment Did You:  Radiation


Psychosocial:  Yes


Anxiety


Integumentary:  No


Blood Disorders:  No


Adverse Reaction/Blood Tranf:  No





Family Medical History





Arthritis


  G8 BROTHER


Cardiovascular disease


  19 FATHER


Cataracts


  19 FATHER


Colon cancer


  19 FATHER


Deafness or hearing loss


  19 FATHER


Diabetes mellitus


  19 FATHER


Hypercholesterolemia


  19 FATHER


Hypertension


  19 FATHER


Myocardial infarction


  19 FATHER


  G8 BROTHER


Respiratory disorder


  G8 BROTHER


CAD Over 55 Years Old





Physical Exam


Vital Signs





Vital Signs - First Documented








 8/26/23





 15:02


 


Temp 36.4


 


Pulse 80


 


Resp 20


 


B/P (MAP) 105/67 (80)


 


Pulse Ox 95


 


O2 Delivery Room Air





Capillary Refill :


Height/Weight/BMI


Height: 5'11.00"


Weight: 259lbs. 1.0oz. 117.719140pi; 34.00 BMI


Method:Stated


General Appearance:  WD/WN, no apparent distress


HEENT:  normal ENT inspection, pharynx normal


Neck:  non-tender, full range of motion, supple, normal inspection


Respiratory:  chest non-tender, lungs clear, normal breath sounds, no 

respiratory distress, no accessory muscle use


Cardiovascular:  regular rate, rhythm, no murmur


Gastrointestinal:  normal bowel sounds, soft, no organomegaly, tenderness 

(Tenderness palpation the epigastric region without rebound or guarding.  No 

mass organomegaly.  No skin changes)


Extremities:  normal range of motion, no calf tenderness, normal capillary 

refill


Neurologic/Psychiatric:  alert, normal mood/affect, oriented x 3


Skin:  normal color, warm/dry





Progress/Results/Core Measures


Results/Orders


Lab Results





Laboratory Tests








Test


 8/26/23


15:10 Range/Units


 


 


White Blood Count


 9.7 


 4.3-11.0


10^3/uL


 


Red Blood Count


 4.20 L


 4.30-5.52


10^6/uL


 


Hemoglobin 12.9 L 13.3-17.7  g/dL


 


Hematocrit 39 L 40-54  %


 


Mean Corpuscular Volume 92  80-99  fL


 


Mean Corpuscular Hemoglobin 31  25-34  pg


 


Mean Corpuscular Hemoglobin


Concent 33 


 32-36  g/dL





 


Red Cell Distribution Width 14.2  10.0-14.5  %


 


Platelet Count


 167 


 130-400


10^3/uL


 


Mean Platelet Volume 10.6  9.0-12.2  fL


 


Immature Granulocyte % (Auto) 0   %


 


Neutrophils (%) (Auto) 80 H 42-75  %


 


Lymphocytes (%) (Auto) 11 L 12-44  %


 


Monocytes (%) (Auto) 8  0-12  %


 


Eosinophils (%) (Auto) 1  0-10  %


 


Basophils (%) (Auto) 0  0-10  %


 


Neutrophils # (Auto)


 7.7 


 1.8-7.8


10^3/uL


 


Lymphocytes # (Auto)


 1.1 


 1.0-4.0


10^3/uL


 


Monocytes # (Auto)


 0.8 


 0.0-1.0


10^3/uL


 


Eosinophils # (Auto)


 0.1 


 0.0-0.3


10^3/uL


 


Basophils # (Auto)


 0.0 


 0.0-0.1


10^3/uL


 


Immature Granulocyte # (Auto)


 0.0 


 0.0-0.1


10^3/uL


 


Sodium Level 134 L 135-145  MMOL/L


 


Potassium Level 3.6  3.6-5.0  MMOL/L


 


Chloride Level 103    MMOL/L


 


Carbon Dioxide Level 20 L 21-32  MMOL/L


 


Anion Gap 11  5-14  MMOL/L


 


Blood Urea Nitrogen 19 H 7-18  MG/DL


 


Creatinine


 1.40 H


 0.60-1.30


MG/DL


 


Estimat Glomerular Filtration


Rate 56 


  





 


BUN/Creatinine Ratio 14   


 


Glucose Level 114 H   MG/DL


 


Calcium Level 9.0  8.5-10.1  MG/DL


 


Corrected Calcium 8.8  8.5-10.1  MG/DL


 


Total Bilirubin 0.8  0.1-1.0  MG/DL


 


Aspartate Amino Transf


(AST/SGOT) 20 


 5-34  U/L





 


Alanine Aminotransferase


(ALT/SGPT) 16 


 0-55  U/L





 


Alkaline Phosphatase 82    U/L


 


Total Protein 6.8  6.4-8.2  GM/DL


 


Albumin 4.2  3.2-4.5  GM/DL


 


Lipase 29  8-78  U/L








My Orders





Orders - KAMILLAASYA DO


Comprehensive Metabolic Panel (8/26/23 15:08)


Lipase (8/26/23 15:08)


Cbc With Automated Diff (8/26/23 15:08)


Sucralfate Tablet (Sucralfate Tablet) (8/26/23 15:15)


Antacid  Suspension (Antacid  Suspension (8/26/23 15:15)


Lidocaine 2% Viscous 15 Ml (Xylocaine Vi (8/26/23 15:15)


Ketorolac Injection (Ketorolac Injection (8/26/23 15:15)


Ed Iv/Invasive Line Start (8/26/23 15:08)





Medications Given in ED





Current Medications








 Medications  Dose


 Ordered  Sig/Vitor


 Route  Start Time


 Stop Time Status Last Admin


Dose Admin


 


 Al Hydrox/Mg


 Hydrox/Simethicone  30 ml  ONCE  ONCE


 PO  8/26/23 15:15


 8/26/23 15:16 DC 8/26/23 15:22


30 ML


 


 Ketorolac


 Tromethamine  15 mg  ONCE  ONCE


 IVP  8/26/23 15:15


 8/26/23 15:16 DC 8/26/23 15:22


15 MG


 


 Lidocaine HCl  5 ml  ONCE  ONCE


 PO  8/26/23 15:15


 8/26/23 15:16 DC 8/26/23 15:22


5 ML


 


 Sucralfate  1 gm  ONCE  ONCE


 PO  8/26/23 15:15


 8/26/23 15:16 DC 8/26/23 15:22


1 GM








Vital Signs/I&O











 8/26/23





 15:02


 


Temp 36.4


 


Pulse 80


 


Resp 20


 


B/P (MAP) 105/67 (80)


 


Pulse Ox 95


 


O2 Delivery Room Air











Departure


Communication (Admissions)


Initial differential considerations include gastritis, gastric or duodenal 

ulcer, pancreatitis, cholecystitis, cholelithiasis, enteritis.  Labs are 

reassuring, no evidence for pancreatitis or acute cholecystitis based on exam 

findings or lab values.  He does feel like he got better relief with the GI 

cocktail than he did with any IV Toradol.  With that I will go ahead and 

prescribe him Carafate as an outpatient.  He does have upper and lower endoscopy

scheduled at [place] upcoming.  This should help discern ulcers versus gallb

ladder pathology.  He is not anemic.  I did go ahead and recommend a bland diet 

at this point as well again is the Carafate as needed.  He will return to care 

for any severe pain.  He will follow-up with GI for any nonemergent needs.





Impression





   Primary Impression:  


   Epigastric pain


Disposition:  01 HOME, SELF-CARE


Condition:  Stable





Departure-Patient Inst.


Referrals:  


MONTSE MAHMOOD (PCP)


Primary Care Physician


Patient Instructions:  Abdominal Pain, Adult ED





Add. Discharge Instructions:  


As discussed is not clear if your symptoms are coming from inflammation of the 

lining of her stomach, an ulcer or from your gallbladder.  Without I recommend 

you keep a bland diet avoiding greasy or fatty foods, spicy foods or really 

acidic foods.  Use the Carafate as needed as this seemed to help you here.  This

will coat the lining of your stomach and help protect her from acid.  Continue 

your acid medicine at home as well.  Continue with your upper and lower 

endoscopy as previously scheduled with a GI specialist.  If this is negative I 

recommend you talk to them about possibly evaluating her gallbladder.  Return to

the emergency department for any severe pain or if your symptoms change in any 

way concerning to you.





All discharge instructions reviewed with patient and/or family. Voiced 

understanding.


Scripts


Sucralfate (Carafate) 1 Gram Tablet


1 GM PO ACHS for 5 Days, #20 TAB


   Prov: ASYA KOHLI DO         8/26/23











ASYA KOHLI DO            Aug 26, 2023 15:11